# Patient Record
Sex: FEMALE | Race: BLACK OR AFRICAN AMERICAN | NOT HISPANIC OR LATINO | Employment: FULL TIME | URBAN - METROPOLITAN AREA
[De-identification: names, ages, dates, MRNs, and addresses within clinical notes are randomized per-mention and may not be internally consistent; named-entity substitution may affect disease eponyms.]

---
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
## 2023-03-24 PROBLEM — K59.09 CHRONIC CONSTIPATION: Status: ACTIVE | Noted: 2023-03-24

## 2023-03-24 PROBLEM — F41.9 ANXIETY AND DEPRESSION: Status: ACTIVE | Noted: 2023-03-24

## 2023-03-24 PROBLEM — K76.89 HEPATIC FOCAL NODULAR HYPERPLASIA: Status: ACTIVE | Noted: 2023-03-24

## 2023-03-24 PROBLEM — M25.519 PAIN OF SHOULDER AFTER TRAUMA: Status: ACTIVE | Noted: 2023-03-24

## 2023-03-24 PROBLEM — N94.6 DYSMENORRHEA: Status: ACTIVE | Noted: 2023-03-24

## 2023-03-24 PROBLEM — E55.9 VITAMIN D DEFICIENCY: Status: ACTIVE | Noted: 2023-03-24

## 2023-03-24 PROBLEM — S09.90XA HEAD TRAUMA: Status: ACTIVE | Noted: 2023-03-24

## 2023-03-24 PROBLEM — K12.1 RADIATION STOMATITIS: Status: ACTIVE | Noted: 2023-03-24

## 2023-03-24 PROBLEM — M54.9 BACK PAIN: Status: ACTIVE | Noted: 2023-03-24

## 2023-03-24 PROBLEM — J20.9 ACUTE BRONCHITIS: Status: ACTIVE | Noted: 2023-03-24

## 2023-03-24 PROBLEM — E06.5 THYROIDITIS, CHRONIC: Status: ACTIVE | Noted: 2023-03-24

## 2023-03-24 PROBLEM — R16.0 LIVER MASS: Status: ACTIVE | Noted: 2023-03-24

## 2023-03-24 PROBLEM — G51.4 EYELID MYOKYMIA: Status: ACTIVE | Noted: 2023-03-24

## 2023-03-24 PROBLEM — H92.02 LEFT EAR PAIN: Status: ACTIVE | Noted: 2023-03-24

## 2023-03-24 PROBLEM — S79.912A INJURY OF LEFT HIP REGION: Status: ACTIVE | Noted: 2023-03-24

## 2023-03-24 PROBLEM — G89.29 CHRONIC LLQ PAIN: Status: ACTIVE | Noted: 2023-03-24

## 2023-03-24 PROBLEM — S06.0X9A CONCUSSION WITH LOSS OF CONSCIOUSNESS: Status: ACTIVE | Noted: 2023-03-24

## 2023-03-24 PROBLEM — R07.9 LEFT-SIDED CHEST PAIN: Status: ACTIVE | Noted: 2023-03-24

## 2023-03-24 PROBLEM — H52.10 MYOPIA: Status: ACTIVE | Noted: 2023-03-24

## 2023-03-24 PROBLEM — G89.29 CHRONIC LOW BACK PAIN: Status: ACTIVE | Noted: 2023-03-24

## 2023-03-24 PROBLEM — R42 DIZZINESS: Status: ACTIVE | Noted: 2023-03-24

## 2023-03-24 PROBLEM — R63.5 WEIGHT GAIN: Status: ACTIVE | Noted: 2023-03-24

## 2023-03-24 PROBLEM — R53.82 CHRONIC FATIGUE: Status: ACTIVE | Noted: 2023-03-24

## 2023-03-24 PROBLEM — N64.9 BREAST LESION: Status: ACTIVE | Noted: 2023-03-24

## 2023-03-24 PROBLEM — U07.1 COVID-19 VIRUS INFECTION: Status: ACTIVE | Noted: 2023-03-24

## 2023-03-24 PROBLEM — C73 PAPILLARY THYROID CARCINOMA (MULTI): Status: ACTIVE | Noted: 2023-03-24

## 2023-03-24 PROBLEM — R10.9 LEFT FLANK PAIN: Status: ACTIVE | Noted: 2023-03-24

## 2023-03-24 PROBLEM — F51.04 CHRONIC INSOMNIA: Status: ACTIVE | Noted: 2023-03-24

## 2023-03-24 PROBLEM — L02.214 INGUINAL ABSCESS: Status: ACTIVE | Noted: 2023-03-24

## 2023-03-24 PROBLEM — E03.9 HYPOTHYROIDISM: Status: ACTIVE | Noted: 2023-03-24

## 2023-03-24 PROBLEM — F32.A ANXIETY AND DEPRESSION: Status: ACTIVE | Noted: 2023-03-24

## 2023-03-24 PROBLEM — R93.2 ABNORMAL LIVER CT: Status: ACTIVE | Noted: 2023-03-24

## 2023-03-24 PROBLEM — R10.84 GENERALIZED ABDOMINAL PAIN: Status: ACTIVE | Noted: 2023-03-24

## 2023-03-24 PROBLEM — J30.1 ALLERGIC RHINITIS DUE TO POLLEN: Status: ACTIVE | Noted: 2023-03-24

## 2023-03-24 PROBLEM — M54.12 CERVICAL RADICULOPATHY: Status: ACTIVE | Noted: 2023-03-24

## 2023-03-24 PROBLEM — S29.9XXA TRAUMA OF CHEST: Status: ACTIVE | Noted: 2023-03-24

## 2023-03-24 PROBLEM — J45.909 ASTHMA (HHS-HCC): Status: ACTIVE | Noted: 2023-03-24

## 2023-03-24 PROBLEM — K86.2 PANCREAS CYST (HHS-HCC): Status: ACTIVE | Noted: 2023-03-24

## 2023-03-24 PROBLEM — S93.402A LEFT ANKLE SPRAIN: Status: ACTIVE | Noted: 2023-03-24

## 2023-03-24 PROBLEM — M54.2 NECK PAIN: Status: ACTIVE | Noted: 2023-03-24

## 2023-03-24 PROBLEM — N89.8 VAGINAL DISCHARGE: Status: ACTIVE | Noted: 2023-03-24

## 2023-03-24 PROBLEM — M54.50 CHRONIC LOW BACK PAIN: Status: ACTIVE | Noted: 2023-03-24

## 2023-03-24 PROBLEM — M26.69 TMJ INFLAMMATION: Status: ACTIVE | Noted: 2023-03-24

## 2023-03-24 PROBLEM — G47.30 SLEEP APNEA: Status: ACTIVE | Noted: 2023-03-24

## 2023-03-24 PROBLEM — R59.0 AXILLARY ADENOPATHY: Status: ACTIVE | Noted: 2023-03-24

## 2023-03-24 PROBLEM — S59.909A ELBOW INJURY: Status: ACTIVE | Noted: 2023-03-24

## 2023-03-24 PROBLEM — G89.28 CHRONIC POST-OPERATIVE PAIN: Status: ACTIVE | Noted: 2023-03-24

## 2023-03-24 PROBLEM — R59.9 ENLARGED LYMPH NODE: Status: ACTIVE | Noted: 2023-03-24

## 2023-03-24 PROBLEM — C73 PRIMARY MALIGNANT NEOPLASM OF THYROID GLAND (MULTI): Status: ACTIVE | Noted: 2023-03-24

## 2023-03-24 PROBLEM — E88.819 INSULIN RESISTANCE: Status: ACTIVE | Noted: 2023-03-24

## 2023-03-24 PROBLEM — R91.8 LUNG NODULES: Status: ACTIVE | Noted: 2023-03-24

## 2023-03-24 PROBLEM — K21.9 GASTRIC REFLUX: Status: ACTIVE | Noted: 2023-03-24

## 2023-03-24 PROBLEM — K21.00 CHRONIC REFLUX ESOPHAGITIS: Status: ACTIVE | Noted: 2023-03-24

## 2023-03-24 PROBLEM — K62.89: Status: ACTIVE | Noted: 2023-03-24

## 2023-03-24 PROBLEM — J30.9 ALLERGIC RHINITIS: Status: ACTIVE | Noted: 2023-03-24

## 2023-03-24 PROBLEM — J30.89 ALLERGIC RHINITIS DUE TO DUST MITE: Status: ACTIVE | Noted: 2023-03-24

## 2023-03-24 PROBLEM — H20.9 TRAUMATIC IRITIS: Status: ACTIVE | Noted: 2023-03-24

## 2023-03-24 PROBLEM — J45.901 ACUTE ASTHMA EXACERBATION (HHS-HCC): Status: ACTIVE | Noted: 2023-03-24

## 2023-03-24 PROBLEM — M25.572 PAIN IN LEFT ANKLE AND JOINTS OF LEFT FOOT: Status: ACTIVE | Noted: 2023-03-24

## 2023-03-24 PROBLEM — M62.89 PELVIC FLOOR DYSFUNCTION: Status: ACTIVE | Noted: 2023-03-24

## 2023-03-24 PROBLEM — N91.1 SECONDARY AMENORRHEA: Status: ACTIVE | Noted: 2023-03-24

## 2023-03-24 PROBLEM — R10.32 CHRONIC LLQ PAIN: Status: ACTIVE | Noted: 2023-03-24

## 2023-03-24 PROBLEM — H92.03 ACUTE OTALGIA, BILATERAL: Status: ACTIVE | Noted: 2023-03-24

## 2023-03-24 PROBLEM — K64.8 INTERNAL HEMORRHOIDS: Status: ACTIVE | Noted: 2023-03-24

## 2023-03-24 PROBLEM — K14.1 GEOGRAPHIC TONGUE: Status: ACTIVE | Noted: 2023-03-24

## 2023-03-24 PROBLEM — R59.0 ANTERIOR CERVICAL ADENOPATHY: Status: ACTIVE | Noted: 2023-03-24

## 2023-03-24 PROBLEM — S80.02XA TRAUMATIC ECCHYMOSIS OF LEFT KNEE: Status: ACTIVE | Noted: 2023-03-24

## 2023-03-24 RX ORDER — SERTRALINE HYDROCHLORIDE 50 MG/1
1 TABLET, FILM COATED ORAL DAILY
COMMUNITY
Start: 2022-11-09 | End: 2023-10-24 | Stop reason: WASHOUT

## 2023-03-24 RX ORDER — ERGOCALCIFEROL 1.25 MG/1
1 CAPSULE ORAL
COMMUNITY
Start: 2020-02-20

## 2023-03-24 RX ORDER — LEVOTHYROXINE SODIUM 137 UG/1
137 TABLET ORAL
COMMUNITY
End: 2024-01-27 | Stop reason: SDUPTHER

## 2023-03-24 RX ORDER — HYDROXYZINE HYDROCHLORIDE 25 MG/1
1-2 TABLET, FILM COATED ORAL NIGHTLY
COMMUNITY
End: 2023-10-24 | Stop reason: WASHOUT

## 2023-03-24 RX ORDER — LIOTHYRONINE SODIUM 5 UG/1
0.5 TABLET ORAL DAILY
COMMUNITY
Start: 2021-04-06 | End: 2023-09-08 | Stop reason: SDUPTHER

## 2023-03-24 RX ORDER — METHOCARBAMOL 500 MG/1
500 TABLET, FILM COATED ORAL 4 TIMES DAILY PRN
COMMUNITY
End: 2023-10-24 | Stop reason: WASHOUT

## 2023-03-24 RX ORDER — FLUTICASONE PROPIONATE 110 UG/1
2 AEROSOL, METERED RESPIRATORY (INHALATION) 2 TIMES DAILY
COMMUNITY
Start: 2021-09-28 | End: 2023-10-24 | Stop reason: WASHOUT

## 2023-03-24 RX ORDER — LORATADINE 10 MG/1
1 TABLET ORAL
COMMUNITY
Start: 2022-01-12 | End: 2023-03-29 | Stop reason: SDUPTHER

## 2023-03-24 RX ORDER — FLUTICASONE PROPIONATE 50 MCG
2 SPRAY, SUSPENSION (ML) NASAL DAILY
COMMUNITY
Start: 2022-06-15

## 2023-03-29 ENCOUNTER — OFFICE VISIT (OUTPATIENT)
Dept: PRIMARY CARE | Facility: CLINIC | Age: 33
End: 2023-03-29
Payer: COMMERCIAL

## 2023-03-29 VITALS
SYSTOLIC BLOOD PRESSURE: 117 MMHG | WEIGHT: 189 LBS | HEIGHT: 63 IN | BODY MASS INDEX: 33.49 KG/M2 | DIASTOLIC BLOOD PRESSURE: 83 MMHG | TEMPERATURE: 98.2 F | HEART RATE: 94 BPM

## 2023-03-29 DIAGNOSIS — N94.6 DYSMENORRHEA: Primary | ICD-10-CM

## 2023-03-29 DIAGNOSIS — J45.909 MILD ASTHMA WITHOUT COMPLICATION, UNSPECIFIED WHETHER PERSISTENT (HHS-HCC): ICD-10-CM

## 2023-03-29 DIAGNOSIS — R97.0 CARCINOEMBRYONIC ANTIGEN (CEA) ELEVATION: ICD-10-CM

## 2023-03-29 PROBLEM — L50.9 HIVES: Status: ACTIVE | Noted: 2023-03-29

## 2023-03-29 PROBLEM — A09 TRAVELER'S DIARRHEA: Status: ACTIVE | Noted: 2023-03-29

## 2023-03-29 PROCEDURE — 99214 OFFICE O/P EST MOD 30 MIN: CPT | Performed by: FAMILY MEDICINE

## 2023-03-29 PROCEDURE — 1036F TOBACCO NON-USER: CPT | Performed by: FAMILY MEDICINE

## 2023-03-29 RX ORDER — LORATADINE 10 MG/1
10 TABLET ORAL
Qty: 30 TABLET | Refills: 2 | Status: SHIPPED | OUTPATIENT
Start: 2023-03-29 | End: 2023-04-28

## 2023-03-29 ASSESSMENT — ENCOUNTER SYMPTOMS
DEPRESSION: 0
LOSS OF SENSATION IN FEET: 0
OCCASIONAL FEELINGS OF UNSTEADINESS: 0

## 2023-03-29 ASSESSMENT — PATIENT HEALTH QUESTIONNAIRE - PHQ9
1. LITTLE INTEREST OR PLEASURE IN DOING THINGS: NOT AT ALL
SUM OF ALL RESPONSES TO PHQ9 QUESTIONS 1 AND 2: 0
2. FEELING DOWN, DEPRESSED OR HOPELESS: NOT AT ALL

## 2023-03-29 ASSESSMENT — PAIN SCALES - GENERAL: PAINLEVEL: 6

## 2023-03-29 NOTE — PROGRESS NOTES
Subjective   Patient ID: Caroline Bauer is a 32 y.o. female who presents for Rash (From traveling to [facility] ).  Rash        The patient is here for the management of a diarrhea and hives that she developed during the end of her trip to [facility] and [facility].  The diarrhea stopped yesterday after she took some 3y old TRAVEL anti diarrhea medication.  She developed the hives few days after returning home.    She is also requewsting a GYN and GI referral for her pelvic pain and CEA level from 2/13/2023.    A review of system was completed.  All systems were reviewed and were normal, except for the ones that are listed in the HPI.    Objective   Physical Exam  Constitutional:       Appearance: Normal appearance.   HENT:      Head: Normocephalic and atraumatic.      Right Ear: Tympanic membrane, ear canal and external ear normal.      Left Ear: Tympanic membrane, ear canal and external ear normal.      Nose: Nose normal.      Mouth/Throat:      Mouth: Mucous membranes are moist.      Pharynx: Oropharynx is clear.   Eyes:      Extraocular Movements: Extraocular movements intact.      Conjunctiva/sclera: Conjunctivae normal.      Pupils: Pupils are equal, round, and reactive to light.   Cardiovascular:      Rate and Rhythm: Normal rate and regular rhythm.      Pulses: Normal pulses.   Pulmonary:      Effort: Pulmonary effort is normal.      Breath sounds: Normal breath sounds.   Abdominal:      General: Abdomen is flat. Bowel sounds are normal.      Palpations: Abdomen is soft.   Musculoskeletal:         General: Normal range of motion.      Cervical back: Normal range of motion and neck supple.   Skin:     General: Skin is warm.   Neurological:      General: No focal deficit present.      Mental Status: She is alert and oriented to person, place, and time. Mental status is at baseline.   Psychiatric:         Mood and Affect: Mood normal.         Behavior: Behavior normal.          Thought Content: Thought content normal.         Judgment: Judgment normal.         Assessment/Plan   Problem List Items Addressed This Visit    None

## 2023-04-05 LAB
THYROTROPIN (MIU/L) IN SER/PLAS BY DETECTION LIMIT <= 0.05 MIU/L: 0.41 MIU/L (ref 0.44–3.98)
THYROXINE (T4) FREE (NG/DL) IN SER/PLAS: 1.34 NG/DL (ref 0.78–1.48)

## 2023-04-08 LAB
THYROGLOBULIN AB (IU/ML) IN SER/PLAS: <0.9 IU/ML (ref 0–4)
THYROGLOBULIN LC-MS/MS: ABNORMAL NG/ML (ref 1.3–31.8)
THYROGLOBULIN: 0.1 NG/ML (ref 1.3–31.8)

## 2023-04-28 ENCOUNTER — OFFICE VISIT (OUTPATIENT)
Dept: PRIMARY CARE | Facility: CLINIC | Age: 33
End: 2023-04-28
Payer: COMMERCIAL

## 2023-04-28 VITALS
BODY MASS INDEX: 33.48 KG/M2 | WEIGHT: 189 LBS | DIASTOLIC BLOOD PRESSURE: 76 MMHG | HEART RATE: 81 BPM | SYSTOLIC BLOOD PRESSURE: 119 MMHG | TEMPERATURE: 97.3 F

## 2023-04-28 DIAGNOSIS — K12.1 STOMATITIS: Primary | ICD-10-CM

## 2023-04-28 PROCEDURE — 99214 OFFICE O/P EST MOD 30 MIN: CPT | Performed by: FAMILY MEDICINE

## 2023-04-28 PROCEDURE — 1036F TOBACCO NON-USER: CPT | Performed by: FAMILY MEDICINE

## 2023-04-28 RX ORDER — MINERAL OIL
180 ENEMA (ML) RECTAL DAILY
Qty: 30 TABLET | Refills: 5 | Status: SHIPPED | OUTPATIENT
Start: 2023-04-28 | End: 2023-06-11

## 2023-04-28 NOTE — PROGRESS NOTES
Subjective   Patient ID: Caroline Bauer is a 32 y.o. female who presents for Adenopathy.  HPI  The patient is here for the management of a burning tongue.  She thinks that she is reacting to some food when she ingests it.  It started about 6 years ago.    A review of system was completed.  All systems were reviewed and were normal, except for the ones that are listed in the HPI.    Objective   Physical Exam  Constitutional:       Appearance: Normal appearance.   HENT:      Head: Normocephalic and atraumatic.      Right Ear: Tympanic membrane, ear canal and external ear normal.      Left Ear: Tympanic membrane, ear canal and external ear normal.      Nose: Nose normal.      Mouth/Throat:      Mouth: Mucous membranes are moist.      Pharynx: Oropharynx is clear.   Eyes:      Extraocular Movements: Extraocular movements intact.      Conjunctiva/sclera: Conjunctivae normal.      Pupils: Pupils are equal, round, and reactive to light.   Cardiovascular:      Rate and Rhythm: Normal rate and regular rhythm.      Pulses: Normal pulses.   Pulmonary:      Effort: Pulmonary effort is normal.      Breath sounds: Normal breath sounds.   Abdominal:      General: Abdomen is flat. Bowel sounds are normal.      Palpations: Abdomen is soft.   Musculoskeletal:         General: Normal range of motion.      Cervical back: Normal range of motion and neck supple.   Skin:     General: Skin is warm.   Neurological:      General: No focal deficit present.      Mental Status: She is alert and oriented to person, place, and time. Mental status is at baseline.   Psychiatric:         Mood and Affect: Mood normal.         Behavior: Behavior normal.         Thought Content: Thought content normal.         Judgment: Judgment normal.         Assessment/Plan   Problem List Items Addressed This Visit          Infectious/Inflammatory    Stomatitis - Primary    Relevant Medications    fexofenadine (Allegra) 180 mg tablet    Other Relevant Orders     Referral to Allergy

## 2023-05-16 ENCOUNTER — OFFICE VISIT (OUTPATIENT)
Dept: PRIMARY CARE | Facility: CLINIC | Age: 33
End: 2023-05-16
Payer: COMMERCIAL

## 2023-05-16 VITALS
BODY MASS INDEX: 32.96 KG/M2 | DIASTOLIC BLOOD PRESSURE: 73 MMHG | SYSTOLIC BLOOD PRESSURE: 110 MMHG | WEIGHT: 186 LBS | TEMPERATURE: 98.6 F | HEIGHT: 63 IN | HEART RATE: 83 BPM

## 2023-05-16 DIAGNOSIS — J06.9 ACUTE URI: Primary | ICD-10-CM

## 2023-05-16 PROCEDURE — 1036F TOBACCO NON-USER: CPT | Performed by: FAMILY MEDICINE

## 2023-05-16 PROCEDURE — 99213 OFFICE O/P EST LOW 20 MIN: CPT | Performed by: FAMILY MEDICINE

## 2023-05-16 RX ORDER — AZITHROMYCIN 250 MG/1
TABLET, FILM COATED ORAL
Qty: 6 TABLET | Refills: 0 | Status: SHIPPED | OUTPATIENT
Start: 2023-05-16 | End: 2023-05-21

## 2023-05-16 ASSESSMENT — PAIN SCALES - GENERAL: PAINLEVEL: 6

## 2023-05-16 NOTE — PROGRESS NOTES
Subjective   Patient ID: Caroline Bauer is a 32 y.o. female who presents for Establish Care (Sick pt has cough, congestion, diarrhea and sick symptoms ).  HPI    The patient is here for a follow-up visit after the treatment of a URI that started  3 weeks ago and is not getting better.  She still a productive cough, mild diarrhea and sore throat.    A review of system was completed.  All systems were reviewed and were normal, except for the ones that are listed in the HPI.    Objective   Physical Exam  Constitutional:       Appearance: Normal appearance.   HENT:      Head: Normocephalic and atraumatic.      Right Ear: Tympanic membrane, ear canal and external ear normal.      Left Ear: Tympanic membrane, ear canal and external ear normal.      Nose: Nose normal.      Mouth/Throat:      Mouth: Mucous membranes are moist.      Pharynx: Oropharynx is clear.   Eyes:      Extraocular Movements: Extraocular movements intact.      Conjunctiva/sclera: Conjunctivae normal.      Pupils: Pupils are equal, round, and reactive to light.   Cardiovascular:      Rate and Rhythm: Normal rate and regular rhythm.      Pulses: Normal pulses.   Pulmonary:      Effort: Pulmonary effort is normal.      Breath sounds: Normal breath sounds.   Abdominal:      General: Abdomen is flat. Bowel sounds are normal.      Palpations: Abdomen is soft.   Musculoskeletal:         General: Normal range of motion.      Cervical back: Normal range of motion and neck supple.   Skin:     General: Skin is warm.   Neurological:      General: No focal deficit present.      Mental Status: She is alert and oriented to person, place, and time. Mental status is at baseline.   Psychiatric:         Mood and Affect: Mood normal.         Behavior: Behavior normal.         Thought Content: Thought content normal.         Judgment: Judgment normal.         Assessment/Plan   Problem List Items Addressed This Visit          Infectious/Inflammatory    Acute URI -  Primary    Relevant Medications    azithromycin (Zithromax) 250 mg tablet

## 2023-05-25 LAB
ALANINE AMINOTRANSFERASE (SGPT) (U/L) IN SER/PLAS: 12 U/L (ref 7–45)
ALBUMIN (G/DL) IN SER/PLAS: 4.5 G/DL (ref 3.4–5)
ALKALINE PHOSPHATASE (U/L) IN SER/PLAS: 40 U/L (ref 33–110)
ANION GAP IN SER/PLAS: 11 MMOL/L (ref 10–20)
ANTI-DNA (DS): <1 IU/ML
ASPARTATE AMINOTRANSFERASE (SGOT) (U/L) IN SER/PLAS: 14 U/L (ref 9–39)
BASOPHILS (10*3/UL) IN BLOOD BY AUTOMATED COUNT: 0.04 X10E9/L (ref 0–0.1)
BASOPHILS/100 LEUKOCYTES IN BLOOD BY AUTOMATED COUNT: 0.5 % (ref 0–2)
BILIRUBIN TOTAL (MG/DL) IN SER/PLAS: 0.4 MG/DL (ref 0–1.2)
C REACTIVE PROTEIN (MG/L) IN SER/PLAS: 0.6 MG/DL
CALCIDIOL (25 OH VITAMIN D3) (NG/ML) IN SER/PLAS: 94 NG/ML
CALCIUM (MG/DL) IN SER/PLAS: 9.8 MG/DL (ref 8.6–10.6)
CARBON DIOXIDE, TOTAL (MMOL/L) IN SER/PLAS: 27 MMOL/L (ref 21–32)
CHLORIDE (MMOL/L) IN SER/PLAS: 106 MMOL/L (ref 98–107)
COMPLEMENT C3 (MG/DL) IN SER/PLAS: 170 MG/DL (ref 87–200)
COMPLEMENT C4 (MG/DL) IN SER/PLAS: 46 MG/DL (ref 10–50)
CREATINE KINASE (U/L) IN SER/PLAS: 99 U/L (ref 0–215)
CREATININE (MG/DL) IN SER/PLAS: 0.75 MG/DL (ref 0.5–1.05)
EOSINOPHILS (10*3/UL) IN BLOOD BY AUTOMATED COUNT: 0.08 X10E9/L (ref 0–0.7)
EOSINOPHILS/100 LEUKOCYTES IN BLOOD BY AUTOMATED COUNT: 0.9 % (ref 0–6)
ERYTHROCYTE DISTRIBUTION WIDTH (RATIO) BY AUTOMATED COUNT: 12.3 % (ref 11.5–14.5)
ERYTHROCYTE MEAN CORPUSCULAR HEMOGLOBIN CONCENTRATION (G/DL) BY AUTOMATED: 32 G/DL (ref 32–36)
ERYTHROCYTE MEAN CORPUSCULAR VOLUME (FL) BY AUTOMATED COUNT: 95 FL (ref 80–100)
ERYTHROCYTES (10*6/UL) IN BLOOD BY AUTOMATED COUNT: 4.36 X10E12/L (ref 4–5.2)
GFR FEMALE: >90 ML/MIN/1.73M2
GLUCOSE (MG/DL) IN SER/PLAS: 88 MG/DL (ref 74–99)
HEMATOCRIT (%) IN BLOOD BY AUTOMATED COUNT: 41.6 % (ref 36–46)
HEMOGLOBIN (G/DL) IN BLOOD: 13.3 G/DL (ref 12–16)
IMMATURE GRANULOCYTES/100 LEUKOCYTES IN BLOOD BY AUTOMATED COUNT: 0.3 % (ref 0–0.9)
LEUKOCYTES (10*3/UL) IN BLOOD BY AUTOMATED COUNT: 8.8 X10E9/L (ref 4.4–11.3)
LYMPHOCYTES (10*3/UL) IN BLOOD BY AUTOMATED COUNT: 2.19 X10E9/L (ref 1.2–4.8)
LYMPHOCYTES/100 LEUKOCYTES IN BLOOD BY AUTOMATED COUNT: 24.9 % (ref 13–44)
MONOCYTES (10*3/UL) IN BLOOD BY AUTOMATED COUNT: 0.45 X10E9/L (ref 0.1–1)
MONOCYTES/100 LEUKOCYTES IN BLOOD BY AUTOMATED COUNT: 5.1 % (ref 2–10)
NEUTROPHILS (10*3/UL) IN BLOOD BY AUTOMATED COUNT: 6.02 X10E9/L (ref 1.2–7.7)
NEUTROPHILS/100 LEUKOCYTES IN BLOOD BY AUTOMATED COUNT: 68.3 % (ref 40–80)
NRBC (PER 100 WBCS) BY AUTOMATED COUNT: 0 /100 WBC (ref 0–0)
PLATELETS (10*3/UL) IN BLOOD AUTOMATED COUNT: 349 X10E9/L (ref 150–450)
POTASSIUM (MMOL/L) IN SER/PLAS: 4.1 MMOL/L (ref 3.5–5.3)
PROTEIN TOTAL: 7.6 G/DL (ref 6.4–8.2)
RHEUMATOID FACTOR (IU/ML) IN SERUM OR PLASMA: <10 IU/ML (ref 0–15)
SEDIMENTATION RATE, ERYTHROCYTE: 15 MM/H (ref 0–20)
SODIUM (MMOL/L) IN SER/PLAS: 140 MMOL/L (ref 136–145)
THYROTROPIN (MIU/L) IN SER/PLAS BY DETECTION LIMIT <= 0.05 MIU/L: 0.11 MIU/L (ref 0.44–3.98)
THYROXINE (T4) FREE (NG/DL) IN SER/PLAS: 1.37 NG/DL (ref 0.78–1.48)
UREA NITROGEN (MG/DL) IN SER/PLAS: 17 MG/DL (ref 6–23)

## 2023-05-26 LAB
ANA PATTERN: ABNORMAL
ANA TITER: ABNORMAL
ANTI-CENTROMERE: <0.2 AI
ANTI-CHROMATIN: <0.2 AI
ANTI-DNA (DS): <1 IU/ML
ANTI-JO-1 IGG: <0.2 AI
ANTI-NUCLEAR ANTIBODY (ANA): POSITIVE
ANTI-RIBOSOMAL P: <0.2 AI
ANTI-RNP: 0.5 AI
ANTI-SCL-70: <0.2 AI
ANTI-SM/RNP: <0.2 AI
ANTI-SM: <0.2 AI
ANTI-SSA: <0.2 AI
ANTI-SSB: <0.2 AI

## 2023-05-29 LAB
CITRULLINE ANTIBODY, IGG: 6 UNITS (ref 0–19)
THYROGLOBULIN AB (IU/ML) IN SER/PLAS: <0.9 IU/ML (ref 0–4)
THYROGLOBULIN LC-MS/MS: ABNORMAL NG/ML (ref 1.3–31.8)
THYROGLOBULIN: 0.1 NG/ML (ref 1.3–31.8)

## 2023-08-25 ENCOUNTER — OFFICE VISIT (OUTPATIENT)
Dept: PRIMARY CARE | Facility: CLINIC | Age: 33
End: 2023-08-25
Payer: COMMERCIAL

## 2023-08-25 VITALS
SYSTOLIC BLOOD PRESSURE: 119 MMHG | WEIGHT: 195.5 LBS | DIASTOLIC BLOOD PRESSURE: 83 MMHG | HEIGHT: 63 IN | TEMPERATURE: 98.2 F | BODY MASS INDEX: 34.64 KG/M2 | HEART RATE: 82 BPM

## 2023-08-25 DIAGNOSIS — N63.24 MASS OF LOWER INNER QUADRANT OF LEFT BREAST: ICD-10-CM

## 2023-08-25 DIAGNOSIS — R07.9 LEFT-SIDED CHEST PAIN: Primary | ICD-10-CM

## 2023-08-25 DIAGNOSIS — R92.8 ABNORMAL MAMMOGRAM OF LEFT BREAST: ICD-10-CM

## 2023-08-25 PROCEDURE — 1036F TOBACCO NON-USER: CPT | Performed by: FAMILY MEDICINE

## 2023-08-25 PROCEDURE — 99214 OFFICE O/P EST MOD 30 MIN: CPT | Performed by: FAMILY MEDICINE

## 2023-08-25 ASSESSMENT — ENCOUNTER SYMPTOMS
LOSS OF SENSATION IN FEET: 0
DEPRESSION: 0
OCCASIONAL FEELINGS OF UNSTEADINESS: 0
BREAST PAIN: 1

## 2023-08-25 NOTE — PROGRESS NOTES
"Subjective   Patient ID: Caroline Bauer is a 32 y.o. female who presents for Breast Pain.  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  Last mammogram was in 7/2022 and showed extremely dense breast and no malignancy.    A review of system was completed.  All systems were reviewed and were normal, except for the ones that are listed in the HPI.    Objective   Physical Exam  Musculoskeletal:         General: Normal range of motion.   Skin:     General: Skin is warm and dry.      Comments: Breasts and axilla: left breast fulness and tender lumps to palpation at 11:00-1:00, 15 cm from nipple.  Left axilla medial and upper region tender lump/ adenopathy.     Assessment/Plan   Problem List Items Addressed This Visit       Left-sided chest pain - Primary    Relevant Orders    BI mammo left diagnostic tomosynthesis    BI US breast limited left    Abnormal mammogram of left breast    Relevant Orders    BI US breast limited left    Mass of lower inner quadrant of left breast     - left breast fulness and tender lumps to palpation at 11:00-1:00, 15 cm from nipple.  Left axilla medial and upper region tender lump/ adenopathy.  -left mammogram and left breast US ordered.         Relevant Orders    BI mammo left diagnostic tomosynthesis    Patient to return to office as needed.  "

## 2023-08-25 NOTE — ASSESSMENT & PLAN NOTE
- left breast fulness and tender lumps to palpation at 11:00-1:00, 15 cm from nipple.  Left axilla medial and upper region tender lump/ adenopathy.  -left mammogram and left breast US ordered.

## 2023-08-31 LAB
ALANINE AMINOTRANSFERASE (SGPT) (U/L) IN SER/PLAS: 18 U/L (ref 7–45)
ALBUMIN (G/DL) IN SER/PLAS: 4.3 G/DL (ref 3.4–5)
ALKALINE PHOSPHATASE (U/L) IN SER/PLAS: 42 U/L (ref 33–110)
ANION GAP IN SER/PLAS: 14 MMOL/L (ref 10–20)
ASPARTATE AMINOTRANSFERASE (SGOT) (U/L) IN SER/PLAS: 20 U/L (ref 9–39)
BILIRUBIN TOTAL (MG/DL) IN SER/PLAS: 0.5 MG/DL (ref 0–1.2)
CALCIDIOL (25 OH VITAMIN D3) (NG/ML) IN SER/PLAS: 84 NG/ML
CALCIUM (MG/DL) IN SER/PLAS: 10.2 MG/DL (ref 8.6–10.6)
CARBON DIOXIDE, TOTAL (MMOL/L) IN SER/PLAS: 25 MMOL/L (ref 21–32)
CHLORIDE (MMOL/L) IN SER/PLAS: 104 MMOL/L (ref 98–107)
CREATININE (MG/DL) IN SER/PLAS: 0.82 MG/DL (ref 0.5–1.05)
ESTIMATED AVERAGE GLUCOSE FOR HBA1C: 97 MG/DL
GFR FEMALE: >90 ML/MIN/1.73M2
GLUCOSE (MG/DL) IN SER/PLAS: 83 MG/DL (ref 74–99)
HEMOGLOBIN A1C/HEMOGLOBIN TOTAL IN BLOOD: 5 %
INSULIN: 11 UIU/ML (ref 3–25)
POTASSIUM (MMOL/L) IN SER/PLAS: 4.4 MMOL/L (ref 3.5–5.3)
PROTEIN TOTAL: 7.5 G/DL (ref 6.4–8.2)
SODIUM (MMOL/L) IN SER/PLAS: 139 MMOL/L (ref 136–145)
THYROTROPIN (MIU/L) IN SER/PLAS BY DETECTION LIMIT <= 0.05 MIU/L: 0.35 MIU/L (ref 0.44–3.98)
THYROXINE (T4) FREE (NG/DL) IN SER/PLAS: 1.26 NG/DL (ref 0.78–1.48)
TRIIODOTHYRONINE (T3) (NG/DL) IN SER/PLAS: 91 NG/DL (ref 60–200)
UREA NITROGEN (MG/DL) IN SER/PLAS: 13 MG/DL (ref 6–23)

## 2023-09-08 ENCOUNTER — OFFICE VISIT (OUTPATIENT)
Dept: PRIMARY CARE | Facility: CLINIC | Age: 33
End: 2023-09-08
Payer: COMMERCIAL

## 2023-09-08 VITALS — DIASTOLIC BLOOD PRESSURE: 84 MMHG | HEART RATE: 86 BPM | TEMPERATURE: 98 F | SYSTOLIC BLOOD PRESSURE: 137 MMHG

## 2023-09-08 DIAGNOSIS — E03.9 HYPOTHYROIDISM, UNSPECIFIED TYPE: Primary | ICD-10-CM

## 2023-09-08 PROCEDURE — 99213 OFFICE O/P EST LOW 20 MIN: CPT | Performed by: FAMILY MEDICINE

## 2023-09-08 PROCEDURE — 1036F TOBACCO NON-USER: CPT | Performed by: FAMILY MEDICINE

## 2023-09-08 RX ORDER — LIOTHYRONINE SODIUM 5 UG/1
2.5 TABLET ORAL DAILY
Qty: 90 TABLET | Refills: 1 | Status: SHIPPED | OUTPATIENT
Start: 2023-09-08 | End: 2023-10-14

## 2023-09-08 NOTE — ASSESSMENT & PLAN NOTE
-under the care of endocrinologist- left the system.    -endocrinologist referral done.  -Cytomel refilled.

## 2023-09-08 NOTE — PROGRESS NOTES
"Subjective   Patient ID: Caroline Bauer is a 32 y.o. female who presents for Med Refill.  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  Her endocrinologist is no longer in the system and she is due for refill.      A review of system was completed.  All systems were reviewed and were normal, except for the ones that are listed in the HPI.    Objective   Physical Exam  Constitutional:       Appearance: Normal appearance.   HENT:      Head: Normocephalic and atraumatic.      Right Ear: Tympanic membrane, ear canal and external ear normal.      Left Ear: Tympanic membrane, ear canal and external ear normal.      Nose: Nose normal.      Mouth/Throat:      Mouth: Mucous membranes are moist.      Pharynx: Oropharynx is clear.   Eyes:      Extraocular Movements: Extraocular movements intact.      Conjunctiva/sclera: Conjunctivae normal.      Pupils: Pupils are equal, round, and reactive to light.   Cardiovascular:      Rate and Rhythm: Normal rate and regular rhythm.      Pulses: Normal pulses.   Pulmonary:      Effort: Pulmonary effort is normal.      Breath sounds: Normal breath sounds.   Abdominal:      General: Abdomen is flat. Bowel sounds are normal.      Palpations: Abdomen is soft.   Musculoskeletal:         General: Normal range of motion.      Cervical back: Normal range of motion and neck supple.   Skin:     General: Skin is warm.   Neurological:      General: No focal deficit present.      Mental Status: She is alert and oriented to person, place, and time. Mental status is at baseline.   Psychiatric:         Mood and Affect: Mood normal.         Behavior: Behavior normal.         Thought Content: Thought content normal.         Judgment: Judgment normal.         Assessment/Plan   Problem List Items Addressed This Visit       Hypothyroidism - Primary    "  -under the care of endocrinologist- left the system.    -endocrinologist referral done.  -Cytomel refilled.          Relevant Medications    liothyronine (Cytomel) 5 mcg tablet    Other Relevant Orders    Referral to Endocrinology      Patient to return to office in 6 months.

## 2023-09-24 PROBLEM — M25.50 ARTHRALGIA OF MULTIPLE JOINTS: Status: ACTIVE | Noted: 2023-09-24

## 2023-09-24 PROBLEM — S79.912A INJURY OF LEFT HIP REGION: Status: RESOLVED | Noted: 2023-09-24 | Resolved: 2023-09-24

## 2023-09-24 PROBLEM — S16.1XXA CERVICAL STRAIN, ACUTE: Status: ACTIVE | Noted: 2023-09-24

## 2023-09-24 PROBLEM — R04.0 EPISTAXIS: Status: ACTIVE | Noted: 2023-09-24

## 2023-09-24 PROBLEM — E66.811 CLASS 1 OBESITY: Status: ACTIVE | Noted: 2023-09-24

## 2023-09-24 PROBLEM — G47.30 SLEEP APNEA: Status: RESOLVED | Noted: 2023-09-24 | Resolved: 2023-09-24

## 2023-09-24 PROBLEM — L02.11 NECK ABSCESS: Status: ACTIVE | Noted: 2023-09-24

## 2023-09-24 PROBLEM — M94.0 COSTOCHONDRITIS: Status: ACTIVE | Noted: 2023-09-24

## 2023-09-24 PROBLEM — R39.11 URINARY HESITANCY: Status: ACTIVE | Noted: 2023-05-29

## 2023-09-24 PROBLEM — Z98.890 HISTORY OF ENDOSCOPY: Status: ACTIVE | Noted: 2023-05-30

## 2023-09-24 PROBLEM — F41.9 ANXIETY: Status: ACTIVE | Noted: 2023-09-24

## 2023-09-24 PROBLEM — E04.1 THYROID NODULE: Status: ACTIVE | Noted: 2023-09-24

## 2023-09-24 PROBLEM — E66.9 CLASS 1 OBESITY: Status: ACTIVE | Noted: 2023-09-24

## 2023-09-24 PROBLEM — K14.6 BURNING MOUTH SYNDROME: Status: ACTIVE | Noted: 2023-09-24

## 2023-09-24 PROBLEM — H66.90 OTITIS MEDIA, ACUTE: Status: RESOLVED | Noted: 2023-09-24 | Resolved: 2023-09-24

## 2023-09-24 PROBLEM — F33.1 MODERATE EPISODE OF RECURRENT MAJOR DEPRESSIVE DISORDER (MULTI): Status: ACTIVE | Noted: 2023-09-24

## 2023-09-24 PROBLEM — R10.30 LOWER ABDOMINAL PAIN: Status: ACTIVE | Noted: 2023-09-24

## 2023-09-24 PROBLEM — L57.9 SKIN CHANGES DUE TO CHRONIC EXPOSURE TO NONIONIZING RADIATION, UNSPECIFIED: Status: ACTIVE | Noted: 2023-05-15

## 2023-09-24 PROBLEM — R27.8 MUSCULAR INCOORDINATION: Status: ACTIVE | Noted: 2023-05-29

## 2023-09-24 PROBLEM — L85.3 XEROSIS CUTIS: Status: ACTIVE | Noted: 2023-05-15

## 2023-09-24 PROBLEM — M54.16 LUMBAR RADICULOPATHY, CHRONIC: Status: ACTIVE | Noted: 2023-09-24

## 2023-09-24 PROBLEM — R20.2 PARESTHESIA: Status: ACTIVE | Noted: 2023-09-24

## 2023-09-24 PROBLEM — J45.20 MILD INTERMITTENT ASTHMA WITHOUT COMPLICATION (HHS-HCC): Status: ACTIVE | Noted: 2023-09-24

## 2023-09-24 PROBLEM — S05.00XA CORNEAL ABRASION: Status: ACTIVE | Noted: 2023-09-24

## 2023-09-24 PROBLEM — S93.401A SPRAIN OF RIGHT ANKLE: Status: ACTIVE | Noted: 2023-09-24

## 2023-09-24 PROBLEM — J30.2 SEASONAL ALLERGIC RHINITIS: Status: ACTIVE | Noted: 2023-09-24

## 2023-09-24 PROBLEM — T14.8XXA CONTUSION: Status: ACTIVE | Noted: 2023-09-24

## 2023-09-24 PROBLEM — K59.02 SPASTIC PELVIC FLOOR SYNDROME: Status: ACTIVE | Noted: 2023-05-29

## 2023-09-24 PROBLEM — T78.1XXA ORAL ALLERGY SYNDROME: Status: ACTIVE | Noted: 2023-09-24

## 2023-09-24 PROBLEM — J30.81 ALLERGIC RHINITIS DUE TO CATS: Status: ACTIVE | Noted: 2023-09-24

## 2023-09-24 PROBLEM — L70.0 ACNE VULGARIS: Status: ACTIVE | Noted: 2023-05-15

## 2023-09-24 PROBLEM — N64.4 PAIN OF LEFT BREAST: Status: ACTIVE | Noted: 2023-09-24

## 2023-09-24 PROBLEM — M46.1 SACROILIITIS (CMS-HCC): Status: ACTIVE | Noted: 2023-09-24

## 2023-09-24 PROBLEM — S29.9XXA TRAUMA OF CHEST: Status: RESOLVED | Noted: 2023-09-24 | Resolved: 2023-09-24

## 2023-09-24 PROBLEM — T81.49XA POSTOPERATIVE ABSCESS: Status: ACTIVE | Noted: 2023-09-24

## 2023-09-24 PROBLEM — K52.9 ENTERITIS: Status: ACTIVE | Noted: 2023-05-30

## 2023-09-24 PROBLEM — R39.14 FEELING OF INCOMPLETE BLADDER EMPTYING: Status: ACTIVE | Noted: 2023-05-29

## 2023-09-24 PROBLEM — H60.333 ACUTE SWIMMER'S EAR OF BOTH SIDES: Status: RESOLVED | Noted: 2023-09-24 | Resolved: 2023-09-24

## 2023-09-24 PROBLEM — Z20.822 EXPOSURE TO COVID-19 VIRUS: Status: RESOLVED | Noted: 2023-09-24 | Resolved: 2023-09-24

## 2023-09-24 PROBLEM — L21.9 SEBORRHEIC DERMATITIS, UNSPECIFIED: Status: ACTIVE | Noted: 2023-05-15

## 2023-09-24 PROBLEM — S80.02XA TRAUMATIC ECCHYMOSIS OF LEFT KNEE: Status: RESOLVED | Noted: 2023-09-24 | Resolved: 2023-09-24

## 2023-09-24 PROBLEM — R93.3 ABNORMAL FINDING ON GI TRACT IMAGING: Status: ACTIVE | Noted: 2023-05-30

## 2023-09-24 PROBLEM — R59.0 POSTERIOR CERVICAL ADENOPATHY: Status: ACTIVE | Noted: 2023-09-24

## 2023-09-24 PROBLEM — R09.82 PND (POST-NASAL DRIP): Status: ACTIVE | Noted: 2023-09-24

## 2023-09-24 PROBLEM — J02.9 SORE THROAT: Status: RESOLVED | Noted: 2023-09-24 | Resolved: 2023-09-24

## 2023-09-24 RX ORDER — CYCLOBENZAPRINE HCL 5 MG
TABLET ORAL
COMMUNITY
Start: 2023-08-23

## 2023-09-24 RX ORDER — BUPROPION HYDROCHLORIDE 75 MG/1
1 TABLET ORAL 2 TIMES DAILY
COMMUNITY
Start: 2022-10-03 | End: 2023-10-24 | Stop reason: WASHOUT

## 2023-09-24 RX ORDER — LORATADINE 10 MG/1
10 TABLET ORAL DAILY
COMMUNITY
Start: 2017-06-06 | End: 2023-10-24 | Stop reason: WASHOUT

## 2023-09-24 RX ORDER — NORETHINDRONE AND ETHINYL ESTRADIOL 0.4-0.035
1 KIT ORAL DAILY
COMMUNITY
Start: 2022-10-04 | End: 2023-10-24 | Stop reason: WASHOUT

## 2023-09-24 RX ORDER — BUPROPION HYDROCHLORIDE 150 MG/1
1 TABLET ORAL DAILY
COMMUNITY
Start: 2022-10-21 | End: 2023-10-24 | Stop reason: WASHOUT

## 2023-09-24 RX ORDER — CLINDAMYCIN PHOSPHATE 10 UG/ML
1 LOTION TOPICAL
COMMUNITY
Start: 2023-05-15 | End: 2023-10-24 | Stop reason: WASHOUT

## 2023-09-24 RX ORDER — KETOCONAZOLE 20 MG/G
CREAM TOPICAL
COMMUNITY
Start: 2021-12-07 | End: 2023-10-24 | Stop reason: WASHOUT

## 2023-09-24 RX ORDER — DULOXETIN HYDROCHLORIDE 20 MG/1
20 CAPSULE, DELAYED RELEASE ORAL EVERY 12 HOURS
COMMUNITY
Start: 2022-11-10 | End: 2023-10-24 | Stop reason: WASHOUT

## 2023-09-24 RX ORDER — FLUCONAZOLE 100 MG/1
1 TABLET ORAL
COMMUNITY
Start: 2022-09-01 | End: 2023-10-24 | Stop reason: WASHOUT

## 2023-09-24 RX ORDER — LIDOCAINE HYDROCHLORIDE 20 MG/ML
5 SOLUTION OROPHARYNGEAL 3 TIMES DAILY PRN
COMMUNITY
Start: 2023-05-03 | End: 2023-10-24 | Stop reason: WASHOUT

## 2023-09-24 RX ORDER — GABAPENTIN 300 MG/1
300 CAPSULE ORAL
COMMUNITY
Start: 2023-04-27 | End: 2023-10-24 | Stop reason: WASHOUT

## 2023-09-24 RX ORDER — BENZOYL PEROXIDE 50 MG/ML
1 LIQUID TOPICAL EVERY MORNING
COMMUNITY
Start: 2023-05-15 | End: 2023-10-24 | Stop reason: WASHOUT

## 2023-09-24 RX ORDER — PANTOPRAZOLE SODIUM 40 MG/1
40 TABLET, DELAYED RELEASE ORAL
COMMUNITY
Start: 2023-05-23 | End: 2023-10-24 | Stop reason: WASHOUT

## 2023-09-24 RX ORDER — FLUOCINOLONE ACETONIDE 0.1 MG/ML
1 SOLUTION TOPICAL
COMMUNITY
Start: 2021-12-07 | End: 2023-10-24 | Stop reason: WASHOUT

## 2023-09-24 RX ORDER — ASPIRIN 325 MG
TABLET, DELAYED RELEASE (ENTERIC COATED) ORAL
COMMUNITY
Start: 2020-01-01 | End: 2024-01-16 | Stop reason: SDUPTHER

## 2023-09-24 RX ORDER — TRIAMCINOLONE ACETONIDE 55 UG/1
2 SPRAY, METERED NASAL
COMMUNITY
Start: 2022-05-09 | End: 2023-10-24 | Stop reason: WASHOUT

## 2023-09-24 RX ORDER — GABAPENTIN 300 MG/1
CAPSULE ORAL SEE ADMIN INSTRUCTIONS
COMMUNITY
Start: 2023-05-21 | End: 2023-10-24 | Stop reason: WASHOUT

## 2023-09-24 RX ORDER — LEVOTHYROXINE SODIUM 125 UG/1
1 TABLET ORAL DAILY
COMMUNITY
Start: 2022-11-15 | End: 2023-10-24 | Stop reason: WASHOUT

## 2023-09-24 RX ORDER — ACETYLCYSTEINE 600 MG
1 CAPSULE ORAL SEE ADMIN INSTRUCTIONS
COMMUNITY
Start: 2023-07-18 | End: 2023-10-24 | Stop reason: WASHOUT

## 2023-09-24 RX ORDER — KETOCONAZOLE 20 MG/ML
SHAMPOO, SUSPENSION TOPICAL
COMMUNITY
Start: 2021-12-07 | End: 2023-10-24 | Stop reason: WASHOUT

## 2023-10-07 DIAGNOSIS — E03.9 HYPOTHYROIDISM, UNSPECIFIED TYPE: ICD-10-CM

## 2023-10-13 ENCOUNTER — TELEPHONE (OUTPATIENT)
Dept: NEPHROLOGY | Facility: CLINIC | Age: 33
End: 2023-10-13
Payer: COMMERCIAL

## 2023-10-14 RX ORDER — LIOTHYRONINE SODIUM 5 UG/1
TABLET ORAL
Qty: 45 TABLET | Refills: 1 | Status: SHIPPED | OUTPATIENT
Start: 2023-10-14

## 2023-10-20 ENCOUNTER — ANCILLARY PROCEDURE (OUTPATIENT)
Dept: RADIOLOGY | Facility: CLINIC | Age: 33
End: 2023-10-20
Payer: COMMERCIAL

## 2023-10-20 DIAGNOSIS — C73 MALIGNANT NEOPLASM OF THYROID GLAND (MULTI): ICD-10-CM

## 2023-10-20 PROCEDURE — 76536 US EXAM OF HEAD AND NECK: CPT | Performed by: RADIOLOGY

## 2023-10-20 PROCEDURE — 76536 US EXAM OF HEAD AND NECK: CPT

## 2023-10-24 ENCOUNTER — OFFICE VISIT (OUTPATIENT)
Dept: PULMONOLOGY | Facility: HOSPITAL | Age: 33
End: 2023-10-24
Payer: COMMERCIAL

## 2023-10-24 VITALS
HEART RATE: 75 BPM | DIASTOLIC BLOOD PRESSURE: 83 MMHG | OXYGEN SATURATION: 97 % | SYSTOLIC BLOOD PRESSURE: 116 MMHG | TEMPERATURE: 96.1 F

## 2023-10-24 DIAGNOSIS — R91.1 LUNG NODULE: Primary | ICD-10-CM

## 2023-10-24 DIAGNOSIS — R91.8 LUNG NODULES: ICD-10-CM

## 2023-10-24 DIAGNOSIS — J45.20 MILD INTERMITTENT ASTHMA WITHOUT COMPLICATION (HHS-HCC): ICD-10-CM

## 2023-10-24 PROCEDURE — 99213 OFFICE O/P EST LOW 20 MIN: CPT | Performed by: INTERNAL MEDICINE

## 2023-10-24 PROCEDURE — 1036F TOBACCO NON-USER: CPT | Performed by: INTERNAL MEDICINE

## 2023-10-24 SDOH — ECONOMIC STABILITY: FOOD INSECURITY: WITHIN THE PAST 12 MONTHS, YOU WORRIED THAT YOUR FOOD WOULD RUN OUT BEFORE YOU GOT MONEY TO BUY MORE.: NEVER TRUE

## 2023-10-24 SDOH — ECONOMIC STABILITY: FOOD INSECURITY: WITHIN THE PAST 12 MONTHS, THE FOOD YOU BOUGHT JUST DIDN'T LAST AND YOU DIDN'T HAVE MONEY TO GET MORE.: NEVER TRUE

## 2023-10-24 ASSESSMENT — PATIENT HEALTH QUESTIONNAIRE - PHQ9
2. FEELING DOWN, DEPRESSED OR HOPELESS: NOT AT ALL
SUM OF ALL RESPONSES TO PHQ9 QUESTIONS 1 AND 2: 0
1. LITTLE INTEREST OR PLEASURE IN DOING THINGS: NOT AT ALL

## 2023-10-24 ASSESSMENT — LIFESTYLE VARIABLES
HOW OFTEN DO YOU HAVE SIX OR MORE DRINKS ON ONE OCCASION: NEVER
SKIP TO QUESTIONS 9-10: 1
AUDIT-C TOTAL SCORE: 0
HOW OFTEN DO YOU HAVE A DRINK CONTAINING ALCOHOL: NEVER
HOW MANY STANDARD DRINKS CONTAINING ALCOHOL DO YOU HAVE ON A TYPICAL DAY: PATIENT DOES NOT DRINK

## 2023-10-24 ASSESSMENT — PAIN SCALES - GENERAL: PAINLEVEL: 0-NO PAIN

## 2023-10-24 NOTE — PATIENT INSTRUCTIONS
Glad you are doing well  - follow up chest CT in January 2024  - albuterol as needed  - no need for flovent for now  - if you start using albuterol daily or wake up at night with cough or wheezing call me to readjust your therapy  - Follow up in this clinic after the chest CT in January. Keep up the exercise and weight loss. Remember you promised 15 pounds less

## 2023-11-09 PROBLEM — J20.9 ACUTE BRONCHITIS: Status: RESOLVED | Noted: 2023-03-24 | Resolved: 2023-11-09

## 2023-11-09 PROBLEM — J45.901 ACUTE ASTHMA EXACERBATION (HHS-HCC): Status: RESOLVED | Noted: 2023-03-24 | Resolved: 2023-11-09

## 2023-11-09 PROBLEM — J45.909 ASTHMA (HHS-HCC): Status: RESOLVED | Noted: 2023-03-24 | Resolved: 2023-11-09

## 2023-11-09 NOTE — PROGRESS NOTES
Department of Medicine  Division of Pulmonary, Critical Care, and Sleep Medicine  Follow-Up Visit  Date of visit: 10/24/2023  Patient: Caroline Bauer    MRN: [id number]  YOB: 1990   Gender: female  ========================================================================  Reason for this visit  Caroline Bauer is a 33 y.o. female never smoker who presents today for follow-up on  asthma and lung nodule   ========================================================================  IMPRESSION and MANAGEMENT  Ms. Bauer is a 33 y.o. female with the following respiratory problems    1. Lung nodule  CT chest high resolution   5 mm in size. I believe it is in the superior segment of the left lower lobe rather than the upper lobe in comparison to the lungs views from CT scan of the chest done in 2020 this nodule was present and seems unchanged. In 2019 CT scan of the neck the lungs views though reached that area but it was not seen then. PET scan shows minimal activity. This could represent old granulomatous disease. Monitoring is reasonable. last scan in May 2023 showed stability. Will continue to monitor  A much smaller nodule in the superior segment of the right lower lobe is also seen at 3 mm which will be monitored as well. radiology believe it related to fissural LN     2. Mild intermittent asthma without complication     CAT score 24  Rare if any use of rescue inhaler  Weight loss helped. Continued exercise advised   3. Thyroid cancer     Following up with endocrinology     ========================================================================  Physician HPI (11/9/2023):  Ms. Bauer is a 33 y.o. female known with lung nodule and asthma here for follow up  Since last visit she has not needed any hospitalization or ED visits  She has started to exercise regularly and follow a reasonable diet resulting in weight loss and healthier activities    From a respiratory stand point   she  Denies dyspnea at rest, or with activities.  mMRC Grade 0  cough is not present.  No sputum production. Hemoptysis is not reported  chest tightness and  wheezing are not present. The previous chest discomfort is resolved  Denies fever or chills, night sweats, weight loss or gain  Denies problems swallowing or chocking on food or cough when eating, or reflux.   no chest pain, orthopnea, PND or LE edema  ========================================================================  Physical Examination:  /83   Pulse 75   Temp 35.6 °C (96.1 °F)   SpO2 97%  There is no height or weight on file to calculate BMI.  GENERAL: normal appearance. well nourished. No respiratory distress  HEAD/SINUSES: no sinus tenderness  OROPHARYNX: Moist mucosa, no thrush or lesions - Mallampati IV (only hard palate visible)  NECK: no JVD, midline trachea without stridor. Thyroid not enlarged  LYMPH NODES : none felt in the cervical, submandibular or supraclavicular regions  LUNGS: Symmetric chest. Good excursion. Equal breath sounds. no wheezing. no crackles or rhonchi  CARDIAC: normal S1 and S2; no gallops, rubs or murmurs. Regular rate and rhythm  EXTREMITIES: No edema, no varicose veins  NEURO: grossly normal mental status, CN reflexes and motor strength.   SKIN: Skin turgor normal. No rashes or lesions.   PSYCH: Normal affect    Pulmonary Function Test Results - my personal interpretation     PFT - April 202 was without any obstruction    Chest CT Scan - my personal interpretation in agreement with radiology   Last scan from  is unchanged size and configuration of the left lower lobe nodule    Current Outpatient Medications   Medication Instructions    albuterol 90 mcg/actuation aerosol powdr breath activated inhaler 2 puffs, inhalation, Every 6 hours PRN    cholecalciferol (Vitamin D-3) 1,250 mcg (50,000 unit) capsule     cyclobenzaprine (Flexeril) 5 mg tablet PLEASE SEE ATTACHED FOR DETAILED DIRECTIONS     ergocalciferol (Vitamin D-2) 1.25 MG (37661 UT) capsule 1 capsule, oral, Weekly    fluticasone (Flonase) 50 mcg/actuation nasal spray 2 sprays, nasal, Daily    levothyroxine (SYNTHROID, LEVOXYL) 137 mcg, oral, Daily before breakfast    liothyronine (Cytomel) 5 mcg tablet TAKE 1/2 TABLETS (2.5 MCG) BY MOUTH ONCE DAILY      ========================================================================  Drug Allergies/Intolerances:  Allergies   Allergen Reactions    Escitalopram Anxiety, Hallucinations, Hives, Other and Palpitations     depression        Disclosure: Parts of this note may have been scribed or generated using voice dictation software, Dragon.  Homophonic errors may exist.  Please contact me directly if clarification is needed    Gurinder Arellano MD  10/24/2023

## 2023-12-06 ENCOUNTER — OFFICE VISIT (OUTPATIENT)
Dept: PRIMARY CARE | Facility: CLINIC | Age: 33
End: 2023-12-06
Payer: COMMERCIAL

## 2023-12-06 VITALS
SYSTOLIC BLOOD PRESSURE: 111 MMHG | HEART RATE: 77 BPM | TEMPERATURE: 98.3 F | WEIGHT: 195.8 LBS | DIASTOLIC BLOOD PRESSURE: 78 MMHG | BODY MASS INDEX: 34.68 KG/M2

## 2023-12-06 DIAGNOSIS — C73 PRIMARY MALIGNANT NEOPLASM OF THYROID GLAND (MULTI): ICD-10-CM

## 2023-12-06 DIAGNOSIS — Z13.1 DIABETES MELLITUS SCREENING: ICD-10-CM

## 2023-12-06 DIAGNOSIS — Z00.00 HEALTH CARE MAINTENANCE: Primary | ICD-10-CM

## 2023-12-06 DIAGNOSIS — Z13.220 SCREENING CHOLESTEROL LEVEL: ICD-10-CM

## 2023-12-06 DIAGNOSIS — R51.9 RIGHT-SIDED HEADACHE: ICD-10-CM

## 2023-12-06 PROCEDURE — 99395 PREV VISIT EST AGE 18-39: CPT | Performed by: FAMILY MEDICINE

## 2023-12-06 PROCEDURE — 1036F TOBACCO NON-USER: CPT | Performed by: FAMILY MEDICINE

## 2023-12-06 PROCEDURE — 99212 OFFICE O/P EST SF 10 MIN: CPT | Performed by: FAMILY MEDICINE

## 2023-12-06 NOTE — ASSESSMENT & PLAN NOTE
-Etiology unsure.  -MRI brain ordered because he of her recent thyroid cancer surgery.   16-Jan-2020 10:25

## 2023-12-06 NOTE — PROGRESS NOTES
"Subjective   Patient ID: Caroline Bauer is a 33 y.o. female who presents for Follow-up.    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  -Covid and Influenza vaccines due.  -pap smear 2021.  Wnl- no HPV.    2-  Right parietal pressure that started  Over a year ago.      A review of system was completed.  All systems were reviewed and were normal, except for the ones that are listed in the HPI.    Objective   Physical Exam  Constitutional:       Appearance: Normal appearance.   HENT:      Head: Normocephalic and atraumatic.      Right Ear: Tympanic membrane, ear canal and external ear normal.      Left Ear: Tympanic membrane, ear canal and external ear normal.      Nose: Nose normal.      Mouth/Throat:      Mouth: Mucous membranes are moist.      Pharynx: Oropharynx is clear.   Eyes:      Extraocular Movements: Extraocular movements intact.      Conjunctiva/sclera: Conjunctivae normal.      Pupils: Pupils are equal, round, and reactive to light.   Cardiovascular:      Rate and Rhythm: Normal rate and regular rhythm.      Pulses: Normal pulses.   Pulmonary:      Effort: Pulmonary effort is normal.      Breath sounds: Normal breath sounds.   Abdominal:      General: Abdomen is flat. Bowel sounds are normal.      Palpations: Abdomen is soft.   Musculoskeletal:         General: Normal range of motion.      Cervical back: Normal range of motion and neck supple.   Skin:     General: Skin is warm.   Neurological:      General: No focal deficit present.      Mental Status: She is alert and oriented to person, place, and time. Mental status is at baseline.   Psychiatric:         Mood and Affect: Mood normal.         Behavior: Behavior normal.         Thought Content: Thought content normal.         Judgment: Judgment normal.     Assessment/Plan   Problem List Items Addressed " This Visit       Primary malignant neoplasm of thyroid gland (CMS/HCC)    Relevant Orders    MR brain wo IV contrast    Health care maintenance - Primary     -Covid and Influenza vaccines due.  -pap smear 2021.  Wnl- no HPV.           Screening cholesterol level    Diabetes mellitus screening    Right-sided headache     -Etiology unsure.  -MRI brain ordered because he of her recent thyroid cancer surgery.         Relevant Orders    MR brain wo IV contrast    Patient to return to office in 6 months.

## 2023-12-21 ENCOUNTER — ANCILLARY PROCEDURE (OUTPATIENT)
Dept: RADIOLOGY | Facility: CLINIC | Age: 33
End: 2023-12-21
Payer: COMMERCIAL

## 2023-12-21 DIAGNOSIS — R51.9 RIGHT-SIDED HEADACHE: ICD-10-CM

## 2023-12-21 DIAGNOSIS — C73 PRIMARY MALIGNANT NEOPLASM OF THYROID GLAND (MULTI): ICD-10-CM

## 2023-12-21 PROCEDURE — 70551 MRI BRAIN STEM W/O DYE: CPT | Performed by: RADIOLOGY

## 2023-12-21 PROCEDURE — 70551 MRI BRAIN STEM W/O DYE: CPT

## 2023-12-26 DIAGNOSIS — J45.20 MILD INTERMITTENT ASTHMA WITHOUT COMPLICATION (HHS-HCC): Primary | ICD-10-CM

## 2023-12-27 ENCOUNTER — APPOINTMENT (OUTPATIENT)
Dept: RADIOLOGY | Facility: CLINIC | Age: 33
End: 2023-12-27
Payer: COMMERCIAL

## 2023-12-27 RX ORDER — DEXAMETHASONE 4 MG/1
2 TABLET ORAL 2 TIMES DAILY
Qty: 1 G | Refills: 3 | Status: SHIPPED | OUTPATIENT
Start: 2023-12-27 | End: 2024-04-11 | Stop reason: WASHOUT
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
## 2024-01-10 ENCOUNTER — OFFICE VISIT (OUTPATIENT)
Dept: OTOLARYNGOLOGY | Facility: HOSPITAL | Age: 34
End: 2024-01-10
Payer: COMMERCIAL

## 2024-01-10 VITALS — HEIGHT: 64 IN | BODY MASS INDEX: 33.61 KG/M2 | TEMPERATURE: 96.4 F

## 2024-01-10 DIAGNOSIS — E03.9 ACQUIRED HYPOTHYROIDISM: ICD-10-CM

## 2024-01-10 DIAGNOSIS — C73 PAPILLARY THYROID CARCINOMA (MULTI): ICD-10-CM

## 2024-01-10 DIAGNOSIS — K13.70 MOUTH LESION: Primary | ICD-10-CM

## 2024-01-10 PROCEDURE — 1036F TOBACCO NON-USER: CPT | Performed by: OTOLARYNGOLOGY

## 2024-01-10 PROCEDURE — 99213 OFFICE O/P EST LOW 20 MIN: CPT | Performed by: OTOLARYNGOLOGY

## 2024-01-10 ASSESSMENT — PATIENT HEALTH QUESTIONNAIRE - PHQ9
1. LITTLE INTEREST OR PLEASURE IN DOING THINGS: NOT AT ALL
2. FEELING DOWN, DEPRESSED OR HOPELESS: NOT AT ALL
SUM OF ALL RESPONSES TO PHQ9 QUESTIONS 1 & 2: 0

## 2024-01-15 ENCOUNTER — APPOINTMENT (OUTPATIENT)
Dept: PRIMARY CARE | Facility: CLINIC | Age: 34
End: 2024-01-15
Payer: COMMERCIAL

## 2024-01-16 ENCOUNTER — OFFICE VISIT (OUTPATIENT)
Dept: PRIMARY CARE | Facility: CLINIC | Age: 34
End: 2024-01-16
Payer: COMMERCIAL

## 2024-01-16 VITALS
HEART RATE: 90 BPM | TEMPERATURE: 96 F | WEIGHT: 200 LBS | DIASTOLIC BLOOD PRESSURE: 84 MMHG | BODY MASS INDEX: 34.33 KG/M2 | SYSTOLIC BLOOD PRESSURE: 119 MMHG

## 2024-01-16 DIAGNOSIS — E03.9 HYPOTHYROIDISM, UNSPECIFIED TYPE: ICD-10-CM

## 2024-01-16 DIAGNOSIS — E89.0 POSTOPERATIVE HYPOTHYROIDISM: ICD-10-CM

## 2024-01-16 DIAGNOSIS — E55.9 VITAMIN D DEFICIENCY: ICD-10-CM

## 2024-01-16 DIAGNOSIS — L02.214 INGUINAL ABSCESS: Primary | ICD-10-CM

## 2024-01-16 PROCEDURE — 99214 OFFICE O/P EST MOD 30 MIN: CPT | Performed by: FAMILY MEDICINE

## 2024-01-16 PROCEDURE — 1036F TOBACCO NON-USER: CPT | Performed by: FAMILY MEDICINE

## 2024-01-16 RX ORDER — LEVOTHYROXINE SODIUM 137 UG/1
137 TABLET ORAL
Qty: 90 TABLET | Refills: 1 | Status: SHIPPED | OUTPATIENT
Start: 2024-01-16 | End: 2025-01-15

## 2024-01-16 RX ORDER — LIOTHYRONINE SODIUM 5 UG/1
5 TABLET ORAL DAILY
Qty: 45 TABLET | Refills: 3 | Status: SHIPPED | OUTPATIENT
Start: 2024-01-16 | End: 2024-01-27 | Stop reason: SDUPTHER

## 2024-01-16 RX ORDER — ASPIRIN 325 MG
TABLET, DELAYED RELEASE (ENTERIC COATED) ORAL
Qty: 12 CAPSULE | Refills: 3 | Status: SHIPPED | OUTPATIENT
Start: 2024-01-16

## 2024-01-16 ASSESSMENT — PAIN SCALES - GENERAL: PAINLEVEL: 4

## 2024-01-16 NOTE — PROGRESS NOTES
"Subjective   Patient ID: Caroline Bauer is a 33 y.o. female who presents for Headache and Med Refill.    Med Refill    The patient is a 34 yo A female with a history of anxiety, s/p right minimally invasive follicular papillary thyroid carcinoma s/p right thyroidectomy \"20 here for:    1- Medication refills: Levothyroxine and Cytomel.    A review of system was completed.  All systems were reviewed and were normal, except for the ones that are listed in the HPI.    Objective   Physical Exam  Constitutional:       Appearance: Normal appearance.   HENT:      Head: Normocephalic and atraumatic.      Right Ear: Tympanic membrane, ear canal and external ear normal.      Left Ear: Tympanic membrane, ear canal and external ear normal.      Nose: Nose normal.      Mouth/Throat:      Mouth: Mucous membranes are moist.      Pharynx: Oropharynx is clear.   Eyes:      Extraocular Movements: Extraocular movements intact.      Conjunctiva/sclera: Conjunctivae normal.      Pupils: Pupils are equal, round, and reactive to light.   Cardiovascular:      Rate and Rhythm: Normal rate and regular rhythm.      Pulses: Normal pulses.   Pulmonary:      Effort: Pulmonary effort is normal.      Breath sounds: Normal breath sounds.   Abdominal:      General: Abdomen is flat. Bowel sounds are normal.      Palpations: Abdomen is soft.   Musculoskeletal:         General: Normal range of motion.      Cervical back: Normal range of motion and neck supple.   Skin:     General: Skin is warm.   Neurological:      General: No focal deficit present.      Mental Status: She is alert and oriented to person, place, and time. Mental status is at baseline.   Psychiatric:         Mood and Affect: Mood normal.         Behavior: Behavior normal.         Thought Content: Thought content normal.         Judgment: Judgment normal.     Assessment/Plan   Problem List Items Addressed This Visit       Hypothyroidism     MEDICATIONS REFILLED ROBEL.         " Relevant Medications    Synthroid 137 mcg tablet    Cytomel 5 mcg tablet    Inguinal abscess - Primary    Vitamin D deficiency    Relevant Medications    cholecalciferol (Vitamin D-3) 50,000 unit capsule    Patient to return to office in 6 months.

## 2024-01-17 DIAGNOSIS — R13.10 ODYNOPHAGIA: Primary | ICD-10-CM

## 2024-01-17 RX ORDER — DEXAMETHASONE 0.5 MG/5ML
SOLUTION ORAL
Qty: 300 ML | Refills: 1 | Status: SHIPPED | OUTPATIENT
Start: 2024-01-17 | End: 2024-01-21 | Stop reason: SDUPTHER

## 2024-01-21 DIAGNOSIS — R13.10 ODYNOPHAGIA: ICD-10-CM

## 2024-01-21 PROBLEM — K13.70 MOUTH LESION: Status: ACTIVE | Noted: 2024-01-21

## 2024-01-21 RX ORDER — DEXAMETHASONE 0.5 MG/5ML
SOLUTION ORAL
Qty: 300 ML | Refills: 2 | Status: SHIPPED | OUTPATIENT
Start: 2024-01-21

## 2024-01-21 ASSESSMENT — ENCOUNTER SYMPTOMS
VOICE CHANGE: 0
UNEXPECTED WEIGHT CHANGE: 0
FATIGUE: 0
NAUSEA: 0
FEVER: 0
STRIDOR: 0
SHORTNESS OF BREATH: 0
CHILLS: 0
FACIAL SWELLING: 0
SORE THROAT: 0
TROUBLE SWALLOWING: 0
COUGH: 0
APPETITE CHANGE: 0
NECK PAIN: 0
ADENOPATHY: 0
VOMITING: 0

## 2024-01-22 ENCOUNTER — TELEPHONE (OUTPATIENT)
Dept: PRIMARY CARE | Facility: CLINIC | Age: 34
End: 2024-01-22
Payer: COMMERCIAL

## 2024-01-22 DIAGNOSIS — E03.9 HYPOTHYROIDISM, UNSPECIFIED TYPE: ICD-10-CM

## 2024-01-22 NOTE — ASSESSMENT & PLAN NOTE
She has an inflamed taste bud - circumvallate papillae   Recommend dexamethasone mouthwash  Reassurance provided, no tongue mass or ulcer

## 2024-01-22 NOTE — TELEPHONE ENCOUNTER
Pt called stating that the cytomel 5 mcg needs to be written as a 90 day supply and the synthroid needs to written as levothyroxine so the pharmacy can give her the medication.

## 2024-01-27 RX ORDER — LIOTHYRONINE SODIUM 5 UG/1
5 TABLET ORAL DAILY
Qty: 90 TABLET | Refills: 1 | Status: SHIPPED | OUTPATIENT
Start: 2024-01-27 | End: 2025-01-26

## 2024-01-27 RX ORDER — LEVOTHYROXINE SODIUM 137 UG/1
137 TABLET ORAL
Qty: 90 TABLET | Refills: 1 | Status: SHIPPED | OUTPATIENT
Start: 2024-01-27 | End: 2024-04-11 | Stop reason: WASHOUT

## 2024-02-06 ENCOUNTER — APPOINTMENT (OUTPATIENT)
Dept: PULMONOLOGY | Facility: HOSPITAL | Age: 34
End: 2024-02-06
Payer: COMMERCIAL
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
## 2024-02-29 PROBLEM — M79.602 PAIN IN LEFT ARM: Status: ACTIVE | Noted: 2023-05-23

## 2024-02-29 PROBLEM — E85.81 LIGHT CHAIN (AL) AMYLOIDOSIS (MULTI): Status: ACTIVE | Noted: 2022-04-06

## 2024-02-29 PROBLEM — Z85.850 PERSONAL HISTORY OF MALIGNANT NEOPLASM OF THYROID: Status: ACTIVE | Noted: 2023-02-13

## 2024-02-29 PROBLEM — L02.214 ABSCESS OF GROIN: Status: ACTIVE | Noted: 2023-03-24

## 2024-02-29 PROBLEM — M25.50 ARTHRALGIA: Status: ACTIVE | Noted: 2024-02-29

## 2024-02-29 PROBLEM — M25.511 CHRONIC RIGHT SHOULDER PAIN: Status: ACTIVE | Noted: 2023-12-20

## 2024-02-29 PROBLEM — R13.10 DYSPHAGIA: Status: ACTIVE | Noted: 2021-12-24

## 2024-02-29 PROBLEM — T66.XXXA RADIATION SICKNESS, UNSPECIFIED, INITIAL ENCOUNTER: Status: ACTIVE | Noted: 2021-12-24

## 2024-02-29 PROBLEM — Z90.89 ACQUIRED ABSENCE OF OTHER ORGANS: Status: ACTIVE | Noted: 2022-11-08

## 2024-02-29 PROBLEM — K59.02 ANISMUS: Status: ACTIVE | Noted: 2023-05-29

## 2024-02-29 PROBLEM — R91.8 MULTIPLE PULMONARY NODULES: Status: ACTIVE | Noted: 2023-03-24

## 2024-02-29 PROBLEM — K76.9 LIVER DISEASE, UNSPECIFIED: Status: ACTIVE | Noted: 2022-11-08

## 2024-02-29 PROBLEM — Z01.00 EYE EXAM, ROUTINE: Status: ACTIVE | Noted: 2024-02-29

## 2024-02-29 PROBLEM — Z92.3 PERSONAL HISTORY OF IRRADIATION: Status: ACTIVE | Noted: 2021-12-24

## 2024-02-29 PROBLEM — M25.579 ACUTE ANKLE PAIN: Status: ACTIVE | Noted: 2024-02-29

## 2024-02-29 PROBLEM — E66.9 OBESITY: Status: ACTIVE | Noted: 2024-02-29

## 2024-02-29 PROBLEM — N80.03 ADENOMYOSIS OF THE UTERUS: Status: ACTIVE | Noted: 2023-01-20

## 2024-02-29 PROBLEM — R25.3 EYELID TWITCH: Status: ACTIVE | Noted: 2024-02-29

## 2024-02-29 PROBLEM — H92.02 OTALGIA OF LEFT EAR: Status: ACTIVE | Noted: 2023-03-24

## 2024-02-29 PROBLEM — G89.29 CHRONIC RIGHT SHOULDER PAIN: Status: ACTIVE | Noted: 2023-12-20

## 2024-02-29 PROBLEM — R51.9 HEADACHE: Status: ACTIVE | Noted: 2023-12-06

## 2024-02-29 PROBLEM — M25.579 CHRONIC ANKLE PAIN: Status: ACTIVE | Noted: 2024-02-29

## 2024-02-29 PROBLEM — R91.8 OTHER NONSPECIFIC ABNORMAL FINDING OF LUNG FIELD: Status: ACTIVE | Noted: 2022-11-08

## 2024-02-29 PROBLEM — R39.89: Status: ACTIVE | Noted: 2023-05-29

## 2024-02-29 PROBLEM — M54.2 NECK PAIN ON RIGHT SIDE: Status: ACTIVE | Noted: 2023-12-20

## 2024-02-29 PROBLEM — K76.89 FOCAL NODULAR HYPERPLASIA OF LIVER: Status: ACTIVE | Noted: 2023-03-24

## 2024-02-29 PROBLEM — M26.69 INFLAMMATION OF TEMPOROMANDIBULAR JOINT: Status: ACTIVE | Noted: 2023-03-24

## 2024-02-29 PROBLEM — T66.XXXA ADVERSE EFFECT OF RADIATION: Status: ACTIVE | Noted: 2024-02-29

## 2024-02-29 PROBLEM — E88.89 OTHER SPECIFIED METABOLIC DISORDERS (MULTI): Status: ACTIVE | Noted: 2022-01-19

## 2024-02-29 PROBLEM — D25.1 INTRAMURAL LEIOMYOMA OF UTERUS: Status: ACTIVE | Noted: 2023-02-01

## 2024-02-29 PROBLEM — H92.09 PAIN OF EAR STRUCTURE: Status: ACTIVE | Noted: 2023-03-24

## 2024-02-29 PROBLEM — G89.29 CHRONIC ANKLE PAIN: Status: ACTIVE | Noted: 2024-02-29

## 2024-02-29 PROBLEM — H10.10 ALLERGIC CONJUNCTIVITIS: Status: ACTIVE | Noted: 2024-02-29

## 2024-02-29 PROBLEM — E89.0 POSTPROCEDURAL HYPOTHYROIDISM: Status: ACTIVE | Noted: 2023-05-12

## 2024-02-29 PROBLEM — T66.XXXA RADIATION DISEASE: Status: ACTIVE | Noted: 2024-02-29

## 2024-02-29 PROBLEM — M25.579 PAIN IN JOINT INVOLVING ANKLE AND FOOT: Status: ACTIVE | Noted: 2023-03-24

## 2024-02-29 RX ORDER — MINERAL OIL
180 ENEMA (ML) RECTAL
COMMUNITY
Start: 2023-04-28 | End: 2024-04-11 | Stop reason: WASHOUT

## 2024-02-29 RX ORDER — NORETHINDRONE ACETATE AND ETHINYL ESTRADIOL 1; 20 MG/1; UG/1
TABLET ORAL
COMMUNITY
Start: 2024-02-03

## 2024-02-29 RX ORDER — FLUOXETINE HYDROCHLORIDE 20 MG/1
CAPSULE ORAL
COMMUNITY
Start: 2022-10-28

## 2024-03-04 ENCOUNTER — APPOINTMENT (OUTPATIENT)
Dept: SURGICAL ONCOLOGY | Facility: CLINIC | Age: 34
End: 2024-03-04
Payer: COMMERCIAL

## 2024-03-04 ENCOUNTER — APPOINTMENT (OUTPATIENT)
Dept: RADIOLOGY | Facility: CLINIC | Age: 34
End: 2024-03-04
Payer: COMMERCIAL

## 2024-03-04 ENCOUNTER — ALLIED HEALTH (OUTPATIENT)
Dept: INTEGRATIVE MEDICINE | Facility: CLINIC | Age: 34
End: 2024-03-04

## 2024-03-04 PROCEDURE — CYTAX SALES TAX CUYAHOGA COUNT: Performed by: MASSAGE THERAPIST

## 2024-03-04 PROCEDURE — MASS(60M) MASSAGE 60 MINUTES: Performed by: MASSAGE THERAPIST
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
## 2024-03-06 ENCOUNTER — OFFICE VISIT (OUTPATIENT)
Dept: PRIMARY CARE | Facility: CLINIC | Age: 34
End: 2024-03-06
Payer: COMMERCIAL

## 2024-03-06 ENCOUNTER — LAB (OUTPATIENT)
Dept: LAB | Facility: LAB | Age: 34
End: 2024-03-06
Payer: COMMERCIAL

## 2024-03-06 VITALS
DIASTOLIC BLOOD PRESSURE: 76 MMHG | WEIGHT: 204 LBS | SYSTOLIC BLOOD PRESSURE: 108 MMHG | TEMPERATURE: 96.9 F | BODY MASS INDEX: 35.02 KG/M2 | HEART RATE: 74 BPM

## 2024-03-06 DIAGNOSIS — N30.01 ACUTE CYSTITIS WITH HEMATURIA: Primary | ICD-10-CM

## 2024-03-06 DIAGNOSIS — R10.12 LUQ ABDOMINAL PAIN: ICD-10-CM

## 2024-03-06 DIAGNOSIS — N30.01 ACUTE CYSTITIS WITH HEMATURIA: ICD-10-CM

## 2024-03-06 LAB
APPEARANCE UR: CLEAR
BILIRUB UR STRIP.AUTO-MCNC: NEGATIVE MG/DL
COLOR UR: NORMAL
GLUCOSE UR STRIP.AUTO-MCNC: NORMAL MG/DL
KETONES UR STRIP.AUTO-MCNC: NEGATIVE MG/DL
LEUKOCYTE ESTERASE UR QL STRIP.AUTO: NEGATIVE
NITRITE UR QL STRIP.AUTO: NEGATIVE
PH UR STRIP.AUTO: 6 [PH]
PROT UR STRIP.AUTO-MCNC: NEGATIVE MG/DL
RBC # UR STRIP.AUTO: NEGATIVE /UL
SP GR UR STRIP.AUTO: 1.03
UROBILINOGEN UR STRIP.AUTO-MCNC: NORMAL MG/DL

## 2024-03-06 PROCEDURE — 99214 OFFICE O/P EST MOD 30 MIN: CPT | Performed by: FAMILY MEDICINE

## 2024-03-06 PROCEDURE — 87086 URINE CULTURE/COLONY COUNT: CPT

## 2024-03-06 PROCEDURE — 1036F TOBACCO NON-USER: CPT | Performed by: FAMILY MEDICINE

## 2024-03-06 PROCEDURE — 81003 URINALYSIS AUTO W/O SCOPE: CPT

## 2024-03-06 RX ORDER — NITROFURANTOIN 25; 75 MG/1; MG/1
100 CAPSULE ORAL 2 TIMES DAILY
Qty: 20 CAPSULE | Refills: 0 | Status: SHIPPED | OUTPATIENT
Start: 2024-03-06 | End: 2024-03-16

## 2024-03-06 ASSESSMENT — ENCOUNTER SYMPTOMS: ABDOMINAL PAIN: 1

## 2024-03-06 NOTE — PROGRESS NOTES
"Subjective   Patient ID: Caroline Bauer is a 33 y.o. female who presents for UTI and Abdominal Pain.    The patient is a 32 yo A female with a history of anxiety, s/p right minimally invasive follicular papillary thyroid carcinoma s/p right thyroidectomy \"20 here for:    1- a UTI  2- RUQ abdominal pain.  It is worse with local pressure and movement of her trunk.     A review of system was completed.  All systems were reviewed and were normal, except for the ones that are listed in the HPI.    Objective   Physical Exam  Constitutional:       Appearance: Normal appearance.   HENT:      Head: Normocephalic and atraumatic.      Right Ear: Tympanic membrane, ear canal and external ear normal.      Left Ear: Tympanic membrane, ear canal and external ear normal.      Nose: Nose normal.      Mouth/Throat:      Mouth: Mucous membranes are moist.      Pharynx: Oropharynx is clear.   Eyes:      Extraocular Movements: Extraocular movements intact.      Conjunctiva/sclera: Conjunctivae normal.      Pupils: Pupils are equal, round, and reactive to light.   Cardiovascular:      Rate and Rhythm: Normal rate and regular rhythm.      Pulses: Normal pulses.   Pulmonary:      Effort: Pulmonary effort is normal.      Breath sounds: Normal breath sounds.   Abdominal:      General: Abdomen is flat. Bowel sounds are normal.      Palpations: Abdomen is soft.      Comments: -Tenderness to palpation of the LUQ abdominal region, at the margin of the anterior and lateral lowest rib.    Musculoskeletal:         General: Normal range of motion.      Cervical back: Normal range of motion and neck supple.   Skin:     General: Skin is warm.   Neurological:      General: No focal deficit present.      Mental Status: She is alert and oriented to person, place, and time. Mental status is at baseline.   Psychiatric:         Mood and Affect: Mood normal.         Behavior: Behavior normal.         Thought Content: Thought content normal.         " Judgment: Judgment normal.   Assessment/Plan   Problem List Items Addressed This Visit       Acute cystitis with hematuria - Primary     -UA w/ C+S ordered.         Relevant Medications    nitrofurantoin, macrocrystal-monohydrate, (Macrobid) 100 mg capsule    Other Relevant Orders    POCT UA Automated manually resulted    Urine Culture    LUQ abdominal pain    Patient to return to office in 6 months.

## 2024-03-07 ENCOUNTER — HOSPITAL ENCOUNTER (OUTPATIENT)
Dept: RADIOLOGY | Facility: CLINIC | Age: 34
Discharge: HOME | End: 2024-03-07
Payer: COMMERCIAL

## 2024-03-07 DIAGNOSIS — R10.12 LUQ ABDOMINAL PAIN: ICD-10-CM

## 2024-03-07 LAB — BACTERIA UR CULT: NORMAL

## 2024-03-07 PROCEDURE — 76700 US EXAM ABDOM COMPLETE: CPT

## 2024-03-07 PROCEDURE — 76700 US EXAM ABDOM COMPLETE: CPT | Performed by: RADIOLOGY

## 2024-03-25 ENCOUNTER — APPOINTMENT (OUTPATIENT)
Dept: INTEGRATIVE MEDICINE | Facility: CLINIC | Age: 34
End: 2024-03-25

## 2024-03-27 ENCOUNTER — HOSPITAL ENCOUNTER (OUTPATIENT)
Dept: RADIOLOGY | Facility: CLINIC | Age: 34
Discharge: HOME | End: 2024-03-27
Payer: COMMERCIAL

## 2024-03-27 ENCOUNTER — OFFICE VISIT (OUTPATIENT)
Dept: PRIMARY CARE | Facility: CLINIC | Age: 34
End: 2024-03-27
Payer: COMMERCIAL

## 2024-03-27 ENCOUNTER — LAB (OUTPATIENT)
Dept: LAB | Facility: LAB | Age: 34
End: 2024-03-27
Payer: COMMERCIAL

## 2024-03-27 VITALS
BODY MASS INDEX: 35.19 KG/M2 | WEIGHT: 205 LBS | DIASTOLIC BLOOD PRESSURE: 89 MMHG | TEMPERATURE: 98 F | SYSTOLIC BLOOD PRESSURE: 137 MMHG | HEART RATE: 87 BPM

## 2024-03-27 DIAGNOSIS — M79.662 BILATERAL CALF PAIN: ICD-10-CM

## 2024-03-27 DIAGNOSIS — Z00.00 ANNUAL PHYSICAL EXAM: ICD-10-CM

## 2024-03-27 DIAGNOSIS — M79.662 BILATERAL CALF PAIN: Primary | ICD-10-CM

## 2024-03-27 DIAGNOSIS — M79.661 BILATERAL CALF PAIN: ICD-10-CM

## 2024-03-27 DIAGNOSIS — M79.661 BILATERAL CALF PAIN: Primary | ICD-10-CM

## 2024-03-27 LAB
ALBUMIN SERPL BCP-MCNC: 4.2 G/DL (ref 3.4–5)
ALP SERPL-CCNC: 43 U/L (ref 33–110)
ALT SERPL W P-5'-P-CCNC: 19 U/L (ref 7–45)
ANION GAP SERPL CALC-SCNC: 12 MMOL/L (ref 10–20)
AST SERPL W P-5'-P-CCNC: 19 U/L (ref 9–39)
BILIRUB SERPL-MCNC: 0.3 MG/DL (ref 0–1.2)
BUN SERPL-MCNC: 13 MG/DL (ref 6–23)
CALCIUM SERPL-MCNC: 9.7 MG/DL (ref 8.6–10.6)
CHLORIDE SERPL-SCNC: 105 MMOL/L (ref 98–107)
CK SERPL-CCNC: 79 U/L (ref 0–215)
CO2 SERPL-SCNC: 27 MMOL/L (ref 21–32)
CREAT SERPL-MCNC: 0.83 MG/DL (ref 0.5–1.05)
D DIMER PPP FEU-MCNC: <215 NG/ML FEU
EGFRCR SERPLBLD CKD-EPI 2021: >90 ML/MIN/1.73M*2
ERYTHROCYTE [DISTWIDTH] IN BLOOD BY AUTOMATED COUNT: 12.2 % (ref 11.5–14.5)
ERYTHROCYTE [SEDIMENTATION RATE] IN BLOOD BY WESTERGREN METHOD: 37 MM/H (ref 0–20)
GLUCOSE SERPL-MCNC: 88 MG/DL (ref 74–99)
HCT VFR BLD AUTO: 40.9 % (ref 36–46)
HGB BLD-MCNC: 13 G/DL (ref 12–16)
MCH RBC QN AUTO: 30.7 PG (ref 26–34)
MCHC RBC AUTO-ENTMCNC: 31.8 G/DL (ref 32–36)
MCV RBC AUTO: 97 FL (ref 80–100)
NRBC BLD-RTO: 0 /100 WBCS (ref 0–0)
PLATELET # BLD AUTO: 365 X10*3/UL (ref 150–450)
POTASSIUM SERPL-SCNC: 4 MMOL/L (ref 3.5–5.3)
PROT SERPL-MCNC: 7.4 G/DL (ref 6.4–8.2)
RBC # BLD AUTO: 4.24 X10*6/UL (ref 4–5.2)
RHEUMATOID FACT SER NEPH-ACNC: <10 IU/ML (ref 0–15)
SODIUM SERPL-SCNC: 140 MMOL/L (ref 136–145)
URATE SERPL-MCNC: 5.3 MG/DL (ref 2.3–6.7)
WBC # BLD AUTO: 9.2 X10*3/UL (ref 4.4–11.3)

## 2024-03-27 PROCEDURE — 80053 COMPREHEN METABOLIC PANEL: CPT

## 2024-03-27 PROCEDURE — 99214 OFFICE O/P EST MOD 30 MIN: CPT | Performed by: FAMILY MEDICINE

## 2024-03-27 PROCEDURE — 93971 EXTREMITY STUDY: CPT | Performed by: RADIOLOGY

## 2024-03-27 PROCEDURE — 93970 EXTREMITY STUDY: CPT

## 2024-03-27 PROCEDURE — 82550 ASSAY OF CK (CPK): CPT

## 2024-03-27 PROCEDURE — 85652 RBC SED RATE AUTOMATED: CPT

## 2024-03-27 PROCEDURE — 36415 COLL VENOUS BLD VENIPUNCTURE: CPT

## 2024-03-27 PROCEDURE — 86235 NUCLEAR ANTIGEN ANTIBODY: CPT

## 2024-03-27 PROCEDURE — 86038 ANTINUCLEAR ANTIBODIES: CPT

## 2024-03-27 PROCEDURE — 86225 DNA ANTIBODY NATIVE: CPT

## 2024-03-27 PROCEDURE — 84550 ASSAY OF BLOOD/URIC ACID: CPT

## 2024-03-27 PROCEDURE — 85379 FIBRIN DEGRADATION QUANT: CPT

## 2024-03-27 PROCEDURE — 85027 COMPLETE CBC AUTOMATED: CPT

## 2024-03-27 PROCEDURE — 86431 RHEUMATOID FACTOR QUANT: CPT

## 2024-03-27 NOTE — PROGRESS NOTES
"Subjective   Patient ID: Caroline Bauer is a 33 y.o. female who presents for pain in both calves.  HPI  The patient is a 32 yo A female with a history of anxiety, s/p right minimally invasive follicular papillary thyroid carcinoma s/p right thyroidectomy \"20 here for the evaluation of a new onset of bilateral calf tightness, right anterior thigh reticular discoloration or erythema and severe leg cramps that started about 3 days.  She had a long ride last week.  No associated chest pain or SOB.     A review of system was completed.  All systems were reviewed and were normal, except for the ones that are listed in the HPI.    Objective   Physical Exam  Constitutional:       Appearance: Normal appearance.   HENT:      Head: Normocephalic and atraumatic.      Right Ear: Tympanic membrane, ear canal and external ear normal.      Left Ear: Tympanic membrane, ear canal and external ear normal.      Nose: Nose normal.      Mouth/Throat:      Mouth: Mucous membranes are moist.      Pharynx: Oropharynx is clear.   Eyes:      Extraocular Movements: Extraocular movements intact.      Conjunctiva/sclera: Conjunctivae normal.      Pupils: Pupils are equal, round, and reactive to light.   Cardiovascular:      Rate and Rhythm: Normal rate and regular rhythm.      Pulses: Normal pulses.   Pulmonary:      Effort: Pulmonary effort is normal.      Breath sounds: Normal breath sounds.   Abdominal:      General: Abdomen is flat. Bowel sounds are normal.      Palpations: Abdomen is soft.   Musculoskeletal:         General: Normal range of motion.      Cervical back: Normal range of motion and neck supple.      Comments: Bilateral calf tenderness to a palpation.   Skin:     General: Skin is warm.   Neurological:      General: No focal deficit present.      Mental Status: She is alert and oriented to person, place, and time. Mental status is at baseline.   Psychiatric:         Mood and Affect: Mood normal.         Behavior: Behavior " normal.         Thought Content: Thought content normal.         Judgment: Judgment normal.     Assessment/Plan   Problem List Items Addressed This Visit       Bilateral calf pain - Primary     -venous doppler US STAT ordered.  -Blood test ordered.  -Tylenol PRN for now.         Relevant Orders    Creatine Kinase    D-dimer, quantitative    Vascular US Lower Extremity Venous Duplex Bilateral    Arthritis Panel (CMS)     Other Visit Diagnoses       Annual physical exam        Relevant Orders    Comprehensive Metabolic Panel    CBC         Patient to return to office after the tests are completed.

## 2024-03-29 LAB
ANA PATTERN: ABNORMAL
ANA SER QL HEP2 SUBST: POSITIVE
ANA TITR SER IF: ABNORMAL {TITER}
CENTROMERE B AB SER-ACNC: <0.2 AI
CHROMATIN AB SERPL-ACNC: <0.2 AI
DSDNA AB SER-ACNC: <1 IU/ML
ENA JO1 AB SER QL IA: <0.2 AI
ENA RNP AB SER IA-ACNC: 0.5 AI
ENA SCL70 AB SER QL IA: <0.2 AI
ENA SM AB SER IA-ACNC: <0.2 AI
ENA SM+RNP AB SER QL IA: <0.2 AI
ENA SS-A AB SER IA-ACNC: <0.2 AI
ENA SS-B AB SER IA-ACNC: <0.2 AI
RIBOSOMAL P AB SER-ACNC: <0.2 AI

## 2024-04-01 ENCOUNTER — APPOINTMENT (OUTPATIENT)
Dept: VASCULAR MEDICINE | Facility: CLINIC | Age: 34
End: 2024-04-01
Payer: COMMERCIAL

## 2024-04-09 ENCOUNTER — APPOINTMENT (OUTPATIENT)
Dept: RHEUMATOLOGY | Facility: CLINIC | Age: 34
End: 2024-04-09
Payer: COMMERCIAL

## 2024-04-10 ENCOUNTER — APPOINTMENT (OUTPATIENT)
Dept: PRIMARY CARE | Facility: CLINIC | Age: 34
End: 2024-04-10
Payer: COMMERCIAL

## 2024-04-11 ENCOUNTER — APPOINTMENT (OUTPATIENT)
Dept: PRIMARY CARE | Facility: CLINIC | Age: 34
End: 2024-04-11
Payer: COMMERCIAL

## 2024-04-11 ENCOUNTER — TELEMEDICINE (OUTPATIENT)
Dept: PRIMARY CARE | Facility: CLINIC | Age: 34
End: 2024-04-11
Payer: COMMERCIAL

## 2024-04-11 DIAGNOSIS — K14.6 BURNING MOUTH SYNDROME: Primary | ICD-10-CM

## 2024-04-11 DIAGNOSIS — L85.3 XEROSIS CUTIS: ICD-10-CM

## 2024-04-11 DIAGNOSIS — E03.9 ACQUIRED HYPOTHYROIDISM: ICD-10-CM

## 2024-04-11 DIAGNOSIS — C73 PAPILLARY THYROID CARCINOMA (MULTI): ICD-10-CM

## 2024-04-11 DIAGNOSIS — E88.819 INSULIN RESISTANCE: ICD-10-CM

## 2024-04-11 PROBLEM — H92.02 LEFT EAR PAIN: Status: RESOLVED | Noted: 2023-03-24 | Resolved: 2024-04-11

## 2024-04-11 PROBLEM — G89.29 CHRONIC LLQ PAIN: Status: RESOLVED | Noted: 2023-03-24 | Resolved: 2024-04-11

## 2024-04-11 PROBLEM — T66.XXXA RADIATION SICKNESS, UNSPECIFIED, INITIAL ENCOUNTER: Status: RESOLVED | Noted: 2021-12-24 | Resolved: 2024-04-11

## 2024-04-11 PROBLEM — L02.214 ABSCESS OF GROIN: Status: RESOLVED | Noted: 2023-03-24 | Resolved: 2024-04-11

## 2024-04-11 PROBLEM — R42 DIZZINESS: Status: RESOLVED | Noted: 2023-03-24 | Resolved: 2024-04-11

## 2024-04-11 PROBLEM — M54.2 NECK PAIN: Status: RESOLVED | Noted: 2023-03-24 | Resolved: 2024-04-11

## 2024-04-11 PROBLEM — N64.9 BREAST LESION: Status: RESOLVED | Noted: 2023-03-24 | Resolved: 2024-04-11

## 2024-04-11 PROBLEM — R27.8 MUSCULAR INCOORDINATION: Status: RESOLVED | Noted: 2023-05-29 | Resolved: 2024-04-11

## 2024-04-11 PROBLEM — S93.401A SPRAIN OF RIGHT ANKLE: Status: RESOLVED | Noted: 2023-09-24 | Resolved: 2024-04-11

## 2024-04-11 PROBLEM — H92.03 ACUTE OTALGIA, BILATERAL: Status: RESOLVED | Noted: 2023-03-24 | Resolved: 2024-04-11

## 2024-04-11 PROBLEM — M54.2 NECK PAIN ON RIGHT SIDE: Status: RESOLVED | Noted: 2023-12-20 | Resolved: 2024-04-11

## 2024-04-11 PROBLEM — R10.84 GENERALIZED ABDOMINAL PAIN: Status: RESOLVED | Noted: 2023-03-24 | Resolved: 2024-04-11

## 2024-04-11 PROBLEM — R39.11 URINARY HESITANCY: Status: RESOLVED | Noted: 2023-05-29 | Resolved: 2024-04-11

## 2024-04-11 PROBLEM — T14.8XXA CONTUSION: Status: RESOLVED | Noted: 2023-09-24 | Resolved: 2024-04-11

## 2024-04-11 PROBLEM — R39.89: Status: RESOLVED | Noted: 2023-05-29 | Resolved: 2024-04-11

## 2024-04-11 PROBLEM — S29.9XXA TRAUMA OF CHEST: Status: RESOLVED | Noted: 2023-03-24 | Resolved: 2024-04-11

## 2024-04-11 PROBLEM — S16.1XXA CERVICAL STRAIN, ACUTE: Status: RESOLVED | Noted: 2023-09-24 | Resolved: 2024-04-11

## 2024-04-11 PROBLEM — M46.1 SACROILIITIS (CMS-HCC): Status: RESOLVED | Noted: 2023-09-24 | Resolved: 2024-04-11

## 2024-04-11 PROBLEM — M25.519 PAIN OF SHOULDER AFTER TRAUMA: Status: RESOLVED | Noted: 2023-03-24 | Resolved: 2024-04-11

## 2024-04-11 PROBLEM — N64.4 PAIN OF LEFT BREAST: Status: RESOLVED | Noted: 2023-09-24 | Resolved: 2024-04-11

## 2024-04-11 PROBLEM — M25.50 ARTHRALGIA OF MULTIPLE JOINTS: Status: RESOLVED | Noted: 2023-09-24 | Resolved: 2024-04-11

## 2024-04-11 PROBLEM — K62.89: Status: RESOLVED | Noted: 2023-03-24 | Resolved: 2024-04-11
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
## 2024-04-11 PROBLEM — R91.8 OTHER NONSPECIFIC ABNORMAL FINDING OF LUNG FIELD: Status: RESOLVED | Noted: 2022-11-08 | Resolved: 2024-04-11

## 2024-04-11 PROBLEM — R20.2 PARESTHESIA: Status: RESOLVED | Noted: 2023-09-24 | Resolved: 2024-04-11

## 2024-04-11 PROBLEM — T66.XXXA ADVERSE EFFECT OF RADIATION: Status: RESOLVED | Noted: 2024-02-29 | Resolved: 2024-04-11

## 2024-04-11 PROBLEM — M25.579 ACUTE ANKLE PAIN: Status: RESOLVED | Noted: 2024-02-29 | Resolved: 2024-04-11

## 2024-04-11 PROBLEM — K52.9 ENTERITIS: Status: RESOLVED | Noted: 2023-05-30 | Resolved: 2024-04-11

## 2024-04-11 PROBLEM — K14.1 GEOGRAPHIC TONGUE: Status: RESOLVED | Noted: 2023-03-24 | Resolved: 2024-04-11

## 2024-04-11 PROBLEM — R13.10 DYSPHAGIA: Status: RESOLVED | Noted: 2021-12-24 | Resolved: 2024-04-11

## 2024-04-11 PROBLEM — R59.9 ENLARGED LYMPH NODE: Status: RESOLVED | Noted: 2023-03-24 | Resolved: 2024-04-11

## 2024-04-11 PROBLEM — M54.50 CHRONIC LOW BACK PAIN: Status: RESOLVED | Noted: 2023-03-24 | Resolved: 2024-04-11

## 2024-04-11 PROBLEM — Z85.850 PERSONAL HISTORY OF MALIGNANT NEOPLASM OF THYROID: Status: RESOLVED | Noted: 2023-02-13 | Resolved: 2024-04-11

## 2024-04-11 PROBLEM — K12.1 STOMATITIS: Status: RESOLVED | Noted: 2023-04-28 | Resolved: 2024-04-11

## 2024-04-11 PROBLEM — A09 TRAVELER'S DIARRHEA: Status: RESOLVED | Noted: 2023-03-29 | Resolved: 2024-04-11

## 2024-04-11 PROBLEM — L50.9 HIVES: Status: RESOLVED | Noted: 2023-03-29 | Resolved: 2024-04-11

## 2024-04-11 PROBLEM — R10.12 LUQ ABDOMINAL PAIN: Status: RESOLVED | Noted: 2024-03-06 | Resolved: 2024-04-11

## 2024-04-11 PROBLEM — S06.0X9A CONCUSSION WITH LOSS OF CONSCIOUSNESS: Status: RESOLVED | Noted: 2023-03-24 | Resolved: 2024-04-11

## 2024-04-11 PROBLEM — K21.9 GASTRIC REFLUX: Status: RESOLVED | Noted: 2023-03-24 | Resolved: 2024-04-11

## 2024-04-11 PROBLEM — M79.662 BILATERAL CALF PAIN: Status: RESOLVED | Noted: 2024-03-27 | Resolved: 2024-04-11

## 2024-04-11 PROBLEM — M94.0 COSTOCHONDRITIS: Status: RESOLVED | Noted: 2023-09-24 | Resolved: 2024-04-11

## 2024-04-11 PROBLEM — R10.32 CHRONIC LLQ PAIN: Status: RESOLVED | Noted: 2023-03-24 | Resolved: 2024-04-11

## 2024-04-11 PROBLEM — R59.0 ANTERIOR CERVICAL ADENOPATHY: Status: RESOLVED | Noted: 2023-03-24 | Resolved: 2024-04-11

## 2024-04-11 PROBLEM — G51.4 EYELID MYOKYMIA: Status: RESOLVED | Noted: 2023-03-24 | Resolved: 2024-04-11

## 2024-04-11 PROBLEM — R51.9 RIGHT-SIDED HEADACHE: Status: RESOLVED | Noted: 2023-12-06 | Resolved: 2024-04-11

## 2024-04-11 PROBLEM — M25.579 PAIN IN JOINT INVOLVING ANKLE AND FOOT: Status: RESOLVED | Noted: 2023-03-24 | Resolved: 2024-04-11

## 2024-04-11 PROBLEM — R16.0 LIVER MASS: Status: RESOLVED | Noted: 2023-03-24 | Resolved: 2024-04-11

## 2024-04-11 PROBLEM — N30.01 ACUTE CYSTITIS WITH HEMATURIA: Status: RESOLVED | Noted: 2024-03-06 | Resolved: 2024-04-11

## 2024-04-11 PROBLEM — R07.9 LEFT-SIDED CHEST PAIN: Status: RESOLVED | Noted: 2023-03-24 | Resolved: 2024-04-11

## 2024-04-11 PROBLEM — Z98.890 HISTORY OF ENDOSCOPY: Status: RESOLVED | Noted: 2023-05-30 | Resolved: 2024-04-11

## 2024-04-11 PROBLEM — S79.912A INJURY OF LEFT HIP REGION: Status: RESOLVED | Noted: 2023-03-24 | Resolved: 2024-04-11

## 2024-04-11 PROBLEM — R10.9 LEFT FLANK PAIN: Status: RESOLVED | Noted: 2023-03-24 | Resolved: 2024-04-11

## 2024-04-11 PROBLEM — R25.3 EYELID TWITCH: Status: RESOLVED | Noted: 2024-02-29 | Resolved: 2024-04-11

## 2024-04-11 PROBLEM — M25.579 CHRONIC ANKLE PAIN: Status: RESOLVED | Noted: 2024-02-29 | Resolved: 2024-04-11

## 2024-04-11 PROBLEM — N89.8 VAGINAL DISCHARGE: Status: RESOLVED | Noted: 2023-03-24 | Resolved: 2024-04-11

## 2024-04-11 PROBLEM — M25.572 PAIN IN LEFT ANKLE AND JOINTS OF LEFT FOOT: Status: RESOLVED | Noted: 2023-03-24 | Resolved: 2024-04-11

## 2024-04-11 PROBLEM — L02.214 INGUINAL ABSCESS: Status: RESOLVED | Noted: 2023-03-24 | Resolved: 2024-04-11

## 2024-04-11 PROBLEM — M54.9 BACK PAIN: Status: RESOLVED | Noted: 2023-03-24 | Resolved: 2024-04-11

## 2024-04-11 PROBLEM — S93.402A LEFT ANKLE SPRAIN: Status: RESOLVED | Noted: 2023-03-24 | Resolved: 2024-04-11

## 2024-04-11 PROBLEM — G89.29 CHRONIC LOW BACK PAIN: Status: RESOLVED | Noted: 2023-03-24 | Resolved: 2024-04-11

## 2024-04-11 PROBLEM — H92.09 PAIN OF EAR STRUCTURE: Status: RESOLVED | Noted: 2023-03-24 | Resolved: 2024-04-11

## 2024-04-11 PROBLEM — G89.29 CHRONIC ANKLE PAIN: Status: RESOLVED | Noted: 2024-02-29 | Resolved: 2024-04-11

## 2024-04-11 PROBLEM — R59.0 POSTERIOR CERVICAL ADENOPATHY: Status: RESOLVED | Noted: 2023-09-24 | Resolved: 2024-04-11

## 2024-04-11 PROBLEM — R93.2 ABNORMAL LIVER CT: Status: RESOLVED | Noted: 2023-03-24 | Resolved: 2024-04-11

## 2024-04-11 PROBLEM — G89.29 CHRONIC RIGHT SHOULDER PAIN: Status: RESOLVED | Noted: 2023-12-20 | Resolved: 2024-04-11

## 2024-04-11 PROBLEM — K12.1 RADIATION STOMATITIS: Status: RESOLVED | Noted: 2023-03-24 | Resolved: 2024-04-11

## 2024-04-11 PROBLEM — R51.9 HEADACHE: Status: RESOLVED | Noted: 2023-12-06 | Resolved: 2024-04-11

## 2024-04-11 PROBLEM — H92.02 OTALGIA OF LEFT EAR: Status: RESOLVED | Noted: 2023-03-24 | Resolved: 2024-04-11

## 2024-04-11 PROBLEM — R10.30 LOWER ABDOMINAL PAIN: Status: RESOLVED | Noted: 2023-09-24 | Resolved: 2024-04-11

## 2024-04-11 PROBLEM — R59.0 AXILLARY ADENOPATHY: Status: RESOLVED | Noted: 2023-03-24 | Resolved: 2024-04-11

## 2024-04-11 PROBLEM — M79.602 PAIN IN LEFT ARM: Status: RESOLVED | Noted: 2023-05-23 | Resolved: 2024-04-11

## 2024-04-11 PROBLEM — M25.50 ARTHRALGIA: Status: RESOLVED | Noted: 2024-02-29 | Resolved: 2024-04-11

## 2024-04-11 PROBLEM — L02.11 NECK ABSCESS: Status: RESOLVED | Noted: 2023-09-24 | Resolved: 2024-04-11

## 2024-04-11 PROBLEM — J06.9 ACUTE URI: Status: RESOLVED | Noted: 2023-05-16 | Resolved: 2024-04-11

## 2024-04-11 PROBLEM — M79.661 BILATERAL CALF PAIN: Status: RESOLVED | Noted: 2024-03-27 | Resolved: 2024-04-11

## 2024-04-11 PROBLEM — R53.82 CHRONIC FATIGUE: Status: RESOLVED | Noted: 2023-03-24 | Resolved: 2024-04-11

## 2024-04-11 PROBLEM — R93.3 ABNORMAL FINDING ON GI TRACT IMAGING: Status: RESOLVED | Noted: 2023-05-30 | Resolved: 2024-04-11

## 2024-04-11 PROBLEM — R91.8 MULTIPLE PULMONARY NODULES: Status: RESOLVED | Noted: 2023-03-24 | Resolved: 2024-04-11

## 2024-04-11 PROBLEM — S09.90XA HEAD TRAUMA: Status: RESOLVED | Noted: 2023-03-24 | Resolved: 2024-04-11

## 2024-04-11 PROBLEM — K76.9 LIVER DISEASE, UNSPECIFIED: Status: RESOLVED | Noted: 2022-11-08 | Resolved: 2024-04-11

## 2024-04-11 PROBLEM — S59.909A ELBOW INJURY: Status: RESOLVED | Noted: 2023-03-24 | Resolved: 2024-04-11

## 2024-04-11 PROBLEM — M25.511 CHRONIC RIGHT SHOULDER PAIN: Status: RESOLVED | Noted: 2023-12-20 | Resolved: 2024-04-11

## 2024-04-11 PROBLEM — S05.00XA CORNEAL ABRASION: Status: RESOLVED | Noted: 2023-09-24 | Resolved: 2024-04-11

## 2024-04-11 PROBLEM — K13.70 MOUTH LESION: Status: RESOLVED | Noted: 2024-01-21 | Resolved: 2024-04-11

## 2024-04-11 PROBLEM — T81.49XA POSTOPERATIVE ABSCESS: Status: RESOLVED | Noted: 2023-09-24 | Resolved: 2024-04-11

## 2024-04-11 PROBLEM — L57.9 SKIN CHANGES DUE TO CHRONIC EXPOSURE TO NONIONIZING RADIATION, UNSPECIFIED: Status: RESOLVED | Noted: 2023-05-15 | Resolved: 2024-04-11

## 2024-04-11 PROBLEM — S80.02XA TRAUMATIC ECCHYMOSIS OF LEFT KNEE: Status: RESOLVED | Noted: 2023-03-24 | Resolved: 2024-04-11

## 2024-04-11 PROBLEM — F41.9 ANXIETY: Status: RESOLVED | Noted: 2023-09-24 | Resolved: 2024-04-11

## 2024-04-11 PROBLEM — M26.69 INFLAMMATION OF TEMPOROMANDIBULAR JOINT: Status: RESOLVED | Noted: 2023-03-24 | Resolved: 2024-04-11

## 2024-04-11 PROCEDURE — 99214 OFFICE O/P EST MOD 30 MIN: CPT | Performed by: FAMILY MEDICINE

## 2024-04-11 NOTE — PROGRESS NOTES
"Subjective   Patient ID: Caroline Bauer is a 33 y.o. 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    A review of system was completed.  All systems were reviewed and were normal, except for the ones that are listed in the HPI.    Objective   Physical Exam    Assessment/Plan   Problem List Items Addressed This Visit       Hypothyroidism    Insulin resistance    Papillary thyroid carcinoma (CMS/HCC)    RESOLVED: Burning mouth syndrome - Primary    RESOLVED: Xerosis cutis   Patient problem list and medication list updated with the patient today.   Patient to return to office in 4 to 6 months.  "

## 2024-04-15 ENCOUNTER — APPOINTMENT (OUTPATIENT)
Dept: INTEGRATIVE MEDICINE | Facility: CLINIC | Age: 34
End: 2024-04-15

## 2024-04-16 ENCOUNTER — OFFICE VISIT (OUTPATIENT)
Dept: RHEUMATOLOGY | Facility: CLINIC | Age: 34
End: 2024-04-16
Payer: COMMERCIAL

## 2024-04-16 ENCOUNTER — LAB (OUTPATIENT)
Dept: LAB | Facility: LAB | Age: 34
End: 2024-04-16
Payer: COMMERCIAL

## 2024-04-16 VITALS
SYSTOLIC BLOOD PRESSURE: 138 MMHG | BODY MASS INDEX: 35.85 KG/M2 | HEIGHT: 64 IN | DIASTOLIC BLOOD PRESSURE: 80 MMHG | HEART RATE: 77 BPM | WEIGHT: 210 LBS

## 2024-04-16 DIAGNOSIS — R76.8 POSITIVE ANA (ANTINUCLEAR ANTIBODY): ICD-10-CM

## 2024-04-16 DIAGNOSIS — R23.1 LIVEDO RETICULARIS: ICD-10-CM

## 2024-04-16 DIAGNOSIS — M25.50 ARTHRALGIA, UNSPECIFIED JOINT: ICD-10-CM

## 2024-04-16 DIAGNOSIS — R76.8 POSITIVE ANA (ANTINUCLEAR ANTIBODY): Primary | ICD-10-CM

## 2024-04-16 LAB
B2 MICROGLOB SERPL-MCNC: 1.4 MG/L (ref 0.7–2.2)
CARDIOLIPIN IGA SERPL-ACNC: 0.5 APL U/ML
CARDIOLIPIN IGG SER IA-ACNC: <1.6 GPL U/ML
CARDIOLIPIN IGM SER IA-ACNC: 0.2 MPL U/ML
CRP SERPL-MCNC: 1.23 MG/DL
ERYTHROCYTE [SEDIMENTATION RATE] IN BLOOD BY WESTERGREN METHOD: 17 MM/H (ref 0–20)

## 2024-04-16 PROCEDURE — 36415 COLL VENOUS BLD VENIPUNCTURE: CPT

## 2024-04-16 PROCEDURE — 85652 RBC SED RATE AUTOMATED: CPT

## 2024-04-16 PROCEDURE — 99214 OFFICE O/P EST MOD 30 MIN: CPT | Performed by: INTERNAL MEDICINE

## 2024-04-16 PROCEDURE — 86147 CARDIOLIPIN ANTIBODY EA IG: CPT

## 2024-04-16 PROCEDURE — 1036F TOBACCO NON-USER: CPT | Performed by: INTERNAL MEDICINE

## 2024-04-16 PROCEDURE — 85613 RUSSELL VIPER VENOM DILUTED: CPT

## 2024-04-16 PROCEDURE — 82232 ASSAY OF BETA-2 PROTEIN: CPT

## 2024-04-16 PROCEDURE — 86140 C-REACTIVE PROTEIN: CPT

## 2024-04-16 ASSESSMENT — PAIN SCALES - GENERAL: PAINLEVEL: 4

## 2024-04-16 NOTE — PROGRESS NOTES
Subjective   Patient ID: Caroline Bauer is a 33 y.o. female who presents for Follow-up (Patient was seen 1 year ago. Pain in both legs.).    HPI  34 yo female with polyarticular joint, body pain.  She was diagnosed with thyroid cancer and using levothyroxine treatment.  She reports polyarticular joint pain and some swelling started 18 months.  Her symptoms started after thyroid surgery.  She also endorses AM stiffness more than one hour.  She also reports fascial intermittent rash, oral ulcer, dry eyes, using eye drops, dry mouth,   She denies Raynaud, chest pain, fever or alopecia  Her mother has arthritis?, her aunt has lupus.     Interval history:  She has noted livedo like lesions in her right leg  She does not have any thrombosis  She does not have any miscarriage, pregnancy issue  Also, she denies any active joint pain, swelling now       ROS  Joint pain in hands: negative   Joint swelling: negative  Morning stiffness and duration: negative   strength: normal  Oral ulcer: negative  Genital ulcer: negative  Raynaud phenomenon: negative  Chest pain/dyspnea: negative  Low back pain: negative  Visual problem: negative  Dry eyes/dry mouth: negative  Skin rash/scaling/psoriasis: negative       Objective     PEXAM  VS reviewed, WNL  General: Alert, no distress   HEENT: Normocephalic/atraumatic, No alopecia. PERRLA. Sclera white, conjunctiva pink, no malar rash. no oral or nasal ulcer. Oral cavity pink and moist, no erythema or exudate, dentition good.   Neck: supple  Respiratory: CTA B, no adventitious breath sounds  Cardiac: RRR, no murmurs, carotid, or bruits  Abdominal: symmetrical, soft, non-tender, non-distended, normoactive BSx4 quadrants, no CVA tenderness or suprapubic tenderness  MSK: Joints of upper and lower extremities were assessed for synovitis and ROM.    Today she has no evidence of synovitis in the joints of her hands or wrists, tender joint count 0, swollen joint count 0   Extremities: no  clubbing, no cyanosis, no edema  Skin: Skin warm and moist.   Neuro: non-focal, Strength 5/5 throughout. Normal gait. No cerebellar pathologic exam     Assessment/Plan   34 yo female with thyroid cancer.  She came to us for generalized body pain started after her thyroid surgery.  Also, she reports sicca symptoms, oral ulcer.  Her HOLLI, RF were negative one year ago.  PExam did not reveal any active synovitis, it showed only livedo like skin lesions in her left upper legs.  No h/o thrombosis or pregnancy complications  Her repeat tests showed weak positive Holli (1/80), neg MIKAL, and rF  -will see her APS antibodies  -will see her in 12 months

## 2024-04-18 LAB
DRVVT SCREEN TO CONFIRM RATIO: 1.28 RATIO
DRVVT/DRVVT CFM NRMLZD PPP-RTO: 1.08 RATIO
DRVVT/DRVVT CFM P DOAC NEUT NORM PPP-RTO: 1.18 RATIO

## 2024-04-30 ENCOUNTER — APPOINTMENT (OUTPATIENT)
Dept: PULMONOLOGY | Facility: HOSPITAL | Age: 34
End: 2024-04-30
Payer: COMMERCIAL

## 2024-06-12 ENCOUNTER — APPOINTMENT (OUTPATIENT)
Dept: OTOLARYNGOLOGY | Facility: HOSPITAL | Age: 34
End: 2024-06-12
Payer: COMMERCIAL

## 2024-06-12 ENCOUNTER — OFFICE VISIT (OUTPATIENT)
Dept: OTOLARYNGOLOGY | Facility: HOSPITAL | Age: 34
End: 2024-06-12
Payer: COMMERCIAL

## 2024-06-12 VITALS — TEMPERATURE: 96.1 F | BODY MASS INDEX: 34.31 KG/M2 | HEIGHT: 64 IN | WEIGHT: 201 LBS

## 2024-06-12 DIAGNOSIS — J30.89 NON-SEASONAL ALLERGIC RHINITIS, UNSPECIFIED TRIGGER: ICD-10-CM

## 2024-06-12 DIAGNOSIS — E03.9 ACQUIRED HYPOTHYROIDISM: Primary | ICD-10-CM

## 2024-06-12 DIAGNOSIS — C73 PRIMARY MALIGNANT NEOPLASM OF THYROID GLAND (MULTI): ICD-10-CM

## 2024-06-12 PROCEDURE — 99213 OFFICE O/P EST LOW 20 MIN: CPT | Performed by: OTOLARYNGOLOGY

## 2024-06-12 RX ORDER — IBUPROFEN 800 MG/1
1 TABLET ORAL EVERY 6 HOURS
COMMUNITY
Start: 2024-03-07

## 2024-06-12 RX ORDER — METFORMIN HYDROCHLORIDE 500 MG/1
1000 TABLET, EXTENDED RELEASE ORAL
COMMUNITY
Start: 2024-05-09

## 2024-06-13 RX ORDER — FLUTICASONE PROPIONATE 50 MCG
2 SPRAY, SUSPENSION (ML) NASAL DAILY
Qty: 16 G | Refills: 2 | Status: SHIPPED | OUTPATIENT
Start: 2024-06-13

## 2024-06-17 ENCOUNTER — APPOINTMENT (OUTPATIENT)
Dept: PRIMARY CARE | Facility: CLINIC | Age: 34
End: 2024-06-17
Payer: COMMERCIAL

## 2024-06-17 VITALS
SYSTOLIC BLOOD PRESSURE: 131 MMHG | TEMPERATURE: 97.3 F | BODY MASS INDEX: 35.19 KG/M2 | HEART RATE: 73 BPM | DIASTOLIC BLOOD PRESSURE: 85 MMHG | WEIGHT: 205 LBS

## 2024-06-17 DIAGNOSIS — S83.92XA SPRAIN OF LEFT KNEE, UNSPECIFIED LIGAMENT, INITIAL ENCOUNTER: Primary | ICD-10-CM

## 2024-06-17 PROCEDURE — 1036F TOBACCO NON-USER: CPT | Performed by: FAMILY MEDICINE

## 2024-06-17 PROCEDURE — 99214 OFFICE O/P EST MOD 30 MIN: CPT | Performed by: FAMILY MEDICINE

## 2024-06-17 ASSESSMENT — PAIN SCALES - GENERAL: PAINLEVEL: 4

## 2024-06-17 NOTE — PROGRESS NOTES
"Subjective   Patient ID: Caroline Bauer is a 33 y.o. female who presents for Follow-up (Left knee pain).  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     A review of system was completed.  All systems were reviewed and were normal, except for the ones that are listed in the HPI.    Objective   Physical Exam  Constitutional:       Appearance: Normal appearance.   HENT:      Head: Normocephalic and atraumatic.      Right Ear: Tympanic membrane, ear canal and external ear normal.      Left Ear: Tympanic membrane, ear canal and external ear normal.      Nose: Nose normal.      Mouth/Throat:      Mouth: Mucous membranes are moist.      Pharynx: Oropharynx is clear.   Eyes:      Extraocular Movements: Extraocular movements intact.      Conjunctiva/sclera: Conjunctivae normal.      Pupils: Pupils are equal, round, and reactive to light.   Cardiovascular:      Rate and Rhythm: Normal rate and regular rhythm.      Pulses: Normal pulses.   Pulmonary:      Effort: Pulmonary effort is normal.      Breath sounds: Normal breath sounds.   Abdominal:      General: Abdomen is flat. Bowel sounds are normal.      Palpations: Abdomen is soft.   Musculoskeletal:         General: Normal range of motion.      Cervical back: Normal range of motion and neck supple.   Skin:     General: Skin is warm.   Neurological:      General: No focal deficit present.      Mental Status: She is alert and oriented to person, place, and time. Mental status is at baseline.   Psychiatric:         Mood and Affect: Mood normal.         Behavior: Behavior normal.         Thought Content: Thought content normal.         Judgment: Judgment normal.     Assessment/Plan   Problem List Items Addressed This Visit       Sprain of left knee - Primary     -Aleve OTC " BID for 2 weeks then PRN.  -Left knee X-ray ordered.          Patient to return to office in 2- 4 weeks if not better.

## 2024-06-18 ENCOUNTER — OFFICE VISIT (OUTPATIENT)
Dept: DERMATOLOGY | Facility: CLINIC | Age: 34
End: 2024-06-18
Payer: COMMERCIAL

## 2024-06-18 DIAGNOSIS — D48.5 NEOPLASM OF UNCERTAIN BEHAVIOR OF SKIN: ICD-10-CM

## 2024-06-18 DIAGNOSIS — L01.00 IMPETIGO: Primary | ICD-10-CM

## 2024-06-18 PROCEDURE — 87205 SMEAR GRAM STAIN: CPT | Performed by: DERMATOLOGY

## 2024-06-18 RX ORDER — MUPIROCIN 20 MG/G
OINTMENT TOPICAL
Qty: 22 G | Refills: 1 | Status: SHIPPED | OUTPATIENT
Start: 2024-06-18 | End: 2024-06-23

## 2024-06-18 NOTE — PROGRESS NOTES
Subjective     Caroline Bauer is a 33 y.o. female who presents for the following: Suspicious Skin Lesion (Pt concerned with lesion on between nose and mouth for about month; /Pt concerned with mole and lesion on left posterior upper lower extremity. ).     Last derm visit 5/2023 with Dr. Botello - acne on face Rx benzoyl peroxide 5% wash, clindamycin lotion. Prior visit 2021 diagnosed with seb derm ketoconazole 2% shampoo, fluocinolone 0.01% solution, and ketoconazole 2% cream    Review of Systems:  No other skin or systemic complaints other than what is documented elsewhere in the note.    The following portions of the chart were reviewed this encounter and updated as appropriate:          Skin Cancer History  No skin cancer on file.      Specialty Problems          Dermatology Problems    Acne vulgaris    Seborrheic dermatitis, unspecified        Objective   Well appearing patient in no apparent distress; mood and affect are within normal limits.    A focused skin examination was performed. All findings within normal limits unless otherwise noted below.    Assessment/Plan   1. Impetigo  Right Upper Cutaneous Lip  Solitary pustule on right upper cutaneous lip    Present for 1 month, drained,not resolved. Culture today. Empirically start mupirocin oint    mupirocin (Bactroban) 2 % ointment - Right Upper Cutaneous Lip  Apply topically 3 times a day for 5 days. To spot on upper lip    Specimen A - Tissue/Wound Culture/Smear      2. Neoplasm of uncertain behavior of skin  Left Thigh - Posterior  0.4 cm skin-colored raised papule with focal darker area, causing pain              Shave removal    Lesion length (cm):  0.4  Lesion width (cm):  0.4  Margin per side (cm):  0.1  Lesion diameter (cm):  0.6  Informed consent: discussed and consent obtained    Timeout: patient name, date of birth, surgical site, and procedure verified    Procedure prep:  Patient was prepped and draped  Anesthesia: the lesion was  anesthetized in a standard fashion    Anesthetic:  1% lidocaine w/ epinephrine 1-100,000 local infiltration  Instrument used: DermaBlade    Hemostasis achieved with: aluminum chloride    Outcome: patient tolerated procedure well    Post-procedure details: sterile dressing applied and wound care instructions given    Dressing type: bandage and petrolatum      Staff Communication: Dermatology Local Anesthesia: Lidocaine 0.5% with Epinephrine 1;200,000 - Amount: up to 2 ml    Specimen 1 - Dermatopathology- DERM LAB  Differential Diagnosis: irritatednevus  Check Margins Yes/No?:    Comments:    Dermpath Lab: Routine Histopathology (formalin-fixed tissue)        Follow up as needed

## 2024-06-20 LAB
LABORATORY COMMENT REPORT: NORMAL
PATH REPORT.FINAL DX SPEC: NORMAL
PATH REPORT.GROSS SPEC: NORMAL
PATH REPORT.MICROSCOPIC SPEC OTHER STN: NORMAL
PATH REPORT.RELEVANT HX SPEC: NORMAL
PATH REPORT.TOTAL CANCER: NORMAL

## 2024-06-21 LAB
BACTERIA SPEC CULT: ABNORMAL
GRAM STN SPEC: ABNORMAL
GRAM STN SPEC: ABNORMAL

## 2024-06-23 PROBLEM — E06.5 THYROIDITIS, CHRONIC: Status: RESOLVED | Noted: 2023-03-24 | Resolved: 2024-06-23

## 2024-06-23 PROBLEM — E04.1 THYROID NODULE: Status: RESOLVED | Noted: 2023-09-24 | Resolved: 2024-06-23

## 2024-06-23 ASSESSMENT — ENCOUNTER SYMPTOMS
SHORTNESS OF BREATH: 0
NAUSEA: 0
SORE THROAT: 0
TROUBLE SWALLOWING: 0
STRIDOR: 0
VOICE CHANGE: 0
NECK PAIN: 0
FATIGUE: 0
VOMITING: 0
FEVER: 0
APPETITE CHANGE: 0
FACIAL SWELLING: 0
ADENOPATHY: 0
CHILLS: 0
COUGH: 0
UNEXPECTED WEIGHT CHANGE: 0

## 2024-06-23 NOTE — ASSESSMENT & PLAN NOTE
Well controlled  Follows with endocrinology, Dr. Naranjo at [facility]   Continue daily synthroid

## 2024-06-23 NOTE — PROGRESS NOTES
Subjective   Patient ID: Caroline Bauer is a 33 y.o. female who presents for Follow-up (Mouth lesion).  Last seen 1/24.  Today is a routine follow up.  Her mouth lesion has not returned.  In addition she has a history of a right +minimally invasive follicular variant of papillary thyroid carcinoma, 2cm s/p right thyroidectomy 1/3/20. She is s/p left completion hemithyroidectomy on 9/4/20.  Last labwork TG 5/24 0.1.  TSH 1.  She is currently following with endocrinology Dr. Naranjo at [facility].  No other ENT concerns today.    HPI    Review of Systems   Constitutional:  Negative for appetite change, chills, fatigue, fever and unexpected weight change.   HENT:  Negative for dental problem, drooling, ear pain, facial swelling, hearing loss, mouth sores, sore throat, tinnitus, trouble swallowing and voice change.    Respiratory:  Negative for cough, shortness of breath and stridor.    Gastrointestinal:  Negative for nausea and vomiting.   Musculoskeletal:  Negative for neck pain.   Hematological:  Negative for adenopathy.   All other systems reviewed and are negative.      Objective   Physical Exam  HENT:      Head: Normocephalic and atraumatic.      Right Ear: Tympanic membrane and ear canal normal.      Left Ear: Tympanic membrane and ear canal normal.      Nose: Nose normal.      Mouth/Throat:      Mouth: Mucous membranes are moist.      Pharynx: Oropharynx is clear.      Comments: No mouth sores  Eyes:      Conjunctiva/sclera: Conjunctivae normal.   Neck:      Comments: S/p thyroidectomy scar well healed.  No recurrent tissue.  No LAD  Neurological:      Mental Status: She is alert and oriented to person, place, and time.   Psychiatric:         Attention and Perception: Attention normal.         Mood and Affect: Mood normal.         Speech: Speech normal.         Behavior: Behavior normal. Behavior is cooperative.         Assessment/Plan   Problem List Items Addressed This Visit             ICD-10-CM     Hypothyroidism - Primary E03.9     Well controlled  Follows with endocrinology, Dr. Naranjo at [facility]   Continue daily synthroid         Primary malignant neoplasm of thyroid gland (Multi) C73     No evidence of disease  Doing well  TG 0.1, TSH 1  Follow up in 1 year        No mouth sores seen on exam today       Slime Paul MD    Head & Neck Surgical Oncology & Reconstruction  Department of Otolaryngology - Head and Neck Surgery

## 2024-06-24 RX ORDER — GENTAMICIN SULFATE 1 MG/G
OINTMENT TOPICAL
Qty: 15 G | Refills: 0 | Status: SHIPPED | OUTPATIENT
Start: 2024-06-24

## 2024-07-02 ENCOUNTER — APPOINTMENT (OUTPATIENT)
Dept: PRIMARY CARE | Facility: CLINIC | Age: 34
End: 2024-07-02
Payer: COMMERCIAL

## 2024-07-02 ENCOUNTER — TELEMEDICINE (OUTPATIENT)
Dept: PRIMARY CARE | Facility: CLINIC | Age: 34
End: 2024-07-02
Payer: COMMERCIAL

## 2024-07-02 DIAGNOSIS — J20.9 ACUTE BRONCHITIS, UNSPECIFIED ORGANISM: Primary | ICD-10-CM

## 2024-07-02 PROCEDURE — 99213 OFFICE O/P EST LOW 20 MIN: CPT | Performed by: FAMILY MEDICINE

## 2024-07-02 RX ORDER — AZITHROMYCIN 250 MG/1
TABLET, FILM COATED ORAL
Qty: 6 TABLET | Refills: 0 | Status: SHIPPED | OUTPATIENT
Start: 2024-07-02

## 2024-07-02 RX ORDER — AZITHROMYCIN 250 MG/1
TABLET, FILM COATED ORAL
Qty: 6 EACH | Refills: 0 | Status: SHIPPED | OUTPATIENT
Start: 2024-07-02

## 2024-07-02 NOTE — PROGRESS NOTES
"Subjective   Patient ID: Caroline Bauer is a 33 y.o. female who presents for a cough.  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  A review of system was completed.  All systems were reviewed and were normal, except for the ones that are listed in the HPI.    Objective   Physical Exam    Assessment/Plan   Problem List Items Addressed This Visit       Acute bronchitis - Primary     -Z-Jonathan started.         Relevant Medications    azithromycin (Zithromax Z-Jonathan) 250 mg tablet      Patient to return to office in 1 to 2 weeks.  "

## 2024-07-02 NOTE — PROGRESS NOTES
"Subjective   Patient ID: Caroline Bauer is a 33 y.o. female who presents for a cough that started a week ago.    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    A review of system was completed.  All systems were reviewed and were normal, except for the ones that are listed in the HPI.    Objective   Physical Exam    Assessment/Plan   Problem List Items Addressed This Visit    None   Patient to return to office if no improvement noted in 1-2 weeks.  "

## 2024-07-09 ENCOUNTER — APPOINTMENT (OUTPATIENT)
Dept: PULMONOLOGY | Facility: HOSPITAL | Age: 34
End: 2024-07-09
Payer: COMMERCIAL

## 2024-07-10 DIAGNOSIS — J30.89 NON-SEASONAL ALLERGIC RHINITIS, UNSPECIFIED TRIGGER: ICD-10-CM

## 2024-07-10 RX ORDER — FLUTICASONE PROPIONATE 50 MCG
2 SPRAY, SUSPENSION (ML) NASAL DAILY
Qty: 16 G | Refills: 3 | Status: SHIPPED | OUTPATIENT
Start: 2024-07-10 | End: 2024-10-08

## 2024-07-12 ENCOUNTER — TELEMEDICINE (OUTPATIENT)
Dept: PRIMARY CARE | Facility: CLINIC | Age: 34
End: 2024-07-12
Payer: COMMERCIAL

## 2024-07-12 DIAGNOSIS — J20.9 ACUTE BRONCHITIS, UNSPECIFIED ORGANISM: Primary | ICD-10-CM

## 2024-07-12 PROCEDURE — 99214 OFFICE O/P EST MOD 30 MIN: CPT | Performed by: FAMILY MEDICINE

## 2024-07-12 RX ORDER — DOXYCYCLINE 100 MG/1
100 CAPSULE ORAL 2 TIMES DAILY
Qty: 20 CAPSULE | Refills: 0 | Status: SHIPPED | OUTPATIENT
Start: 2024-07-12 | End: 2024-07-22

## 2024-07-12 NOTE — PROGRESS NOTES
"Subjective   Patient ID: Caroline Bauer is a 33 y.o. female who presents for No chief complaint on file..  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    A review of system was completed.  All systems were reviewed and were normal, except for the ones that are listed in the HPI.    Objective   Physical Exam  Constitutional:       Appearance: Normal appearance.   HENT:      Head: Normocephalic and atraumatic.      Right Ear: Tympanic membrane, ear canal and external ear normal.      Left Ear: Tympanic membrane, ear canal and external ear normal.      Nose: Nose normal.      Mouth/Throat:      Mouth: Mucous membranes are moist.      Pharynx: Oropharynx is clear.   Eyes:      Extraocular Movements: Extraocular movements intact.      Conjunctiva/sclera: Conjunctivae normal.      Pupils: Pupils are equal, round, and reactive to light.   Cardiovascular:      Rate and Rhythm: Normal rate and regular rhythm.      Pulses: Normal pulses.   Pulmonary:      Effort: Pulmonary effort is normal.      Breath sounds: Normal breath sounds.   Abdominal:      General: Abdomen is flat. Bowel sounds are normal.      Palpations: Abdomen is soft.   Musculoskeletal:         General: Normal range of motion.      Cervical back: Normal range of motion and neck supple.   Skin:     General: Skin is warm.   Neurological:      General: No focal deficit present.      Mental Status: She is alert and oriented to person, place, and time. Mental status is at baseline.   Psychiatric:         Mood and Affect: Mood normal.         Behavior: Behavior normal.         Thought Content: Thought content normal.         Judgment: Judgment normal.     Assessment/Plan   Problem List Items Addressed This Visit       Acute bronchitis - " Primary     -not improving.  -Z-pack already.  -Doxycycline 100 mg BID for 10 days started today.         Relevant Medications    doxycycline (Vibramycin) 100 mg capsule      Patient to return to office in 2- 4 weeks if not better.

## 2024-07-17 ENCOUNTER — APPOINTMENT (OUTPATIENT)
Dept: OTOLARYNGOLOGY | Facility: HOSPITAL | Age: 34
End: 2024-07-17
Payer: COMMERCIAL

## 2024-07-23 ENCOUNTER — OFFICE VISIT (OUTPATIENT)
Dept: PULMONOLOGY | Facility: HOSPITAL | Age: 34
End: 2024-07-23
Payer: COMMERCIAL

## 2024-07-23 VITALS
SYSTOLIC BLOOD PRESSURE: 115 MMHG | DIASTOLIC BLOOD PRESSURE: 80 MMHG | TEMPERATURE: 97.2 F | HEART RATE: 69 BPM | BODY MASS INDEX: 37.08 KG/M2 | OXYGEN SATURATION: 98 % | WEIGHT: 216 LBS

## 2024-07-23 DIAGNOSIS — R91.8 LUNG NODULES: Primary | ICD-10-CM

## 2024-07-23 DIAGNOSIS — J45.20 MILD INTERMITTENT ASTHMA WITHOUT COMPLICATION (HHS-HCC): ICD-10-CM

## 2024-07-23 PROCEDURE — 99213 OFFICE O/P EST LOW 20 MIN: CPT | Performed by: INTERNAL MEDICINE

## 2024-07-23 RX ORDER — BUDESONIDE AND FORMOTEROL FUMARATE DIHYDRATE 160; 4.5 UG/1; UG/1
2 AEROSOL RESPIRATORY (INHALATION)
Qty: 10.2 G | Refills: 5 | Status: SHIPPED | OUTPATIENT
Start: 2024-07-23 | End: 2025-01-19

## 2024-07-23 SDOH — ECONOMIC STABILITY: FOOD INSECURITY: WITHIN THE PAST 12 MONTHS, THE FOOD YOU BOUGHT JUST DIDN'T LAST AND YOU DIDN'T HAVE MONEY TO GET MORE.: NEVER TRUE

## 2024-07-23 SDOH — ECONOMIC STABILITY: FOOD INSECURITY: WITHIN THE PAST 12 MONTHS, YOU WORRIED THAT YOUR FOOD WOULD RUN OUT BEFORE YOU GOT MONEY TO BUY MORE.: NEVER TRUE

## 2024-07-23 ASSESSMENT — LIFESTYLE VARIABLES
SKIP TO QUESTIONS 9-10: 1
HOW MANY STANDARD DRINKS CONTAINING ALCOHOL DO YOU HAVE ON A TYPICAL DAY: PATIENT DOES NOT DRINK
AUDIT-C TOTAL SCORE: 0
HOW OFTEN DO YOU HAVE SIX OR MORE DRINKS ON ONE OCCASION: NEVER
HOW OFTEN DO YOU HAVE A DRINK CONTAINING ALCOHOL: NEVER

## 2024-07-23 ASSESSMENT — PATIENT HEALTH QUESTIONNAIRE - PHQ9
1. LITTLE INTEREST OR PLEASURE IN DOING THINGS: NOT AT ALL
SUM OF ALL RESPONSES TO PHQ9 QUESTIONS 1 & 2: 0
2. FEELING DOWN, DEPRESSED OR HOPELESS: NOT AT ALL

## 2024-07-23 ASSESSMENT — PAIN SCALES - GENERAL: PAINLEVEL: 5

## 2024-07-23 NOTE — PROGRESS NOTES
Department of Medicine  Division of Pulmonary, Critical Care, and Sleep Medicine  Follow-Up Visit  Date of visit: 07/23/2024  Patient: Caroline Bauer    MRN: [id number]  YOB: 1990   Gender: female  ========================================================================  Reason for this visit  Caroline Bauer is a 33 y.o. female never smoker who presents today for follow-up on  asthma and lung nodule  ========================================================================  IMPRESSION and RECOMMENDATIONS  Ms. Bauer is a 33 y.o. female with the following respiratory problems  Lung nodule - needs follow up  Mild intermittent asthma - sub-optimal control  Weight gain and decreased fitness level  The plan of care is as follows  Chest CT to follow up on lung nodule  Start symbicort 2 puffs twice a day  Albuterol as needed  Re-adjust diet   Start swimming and exercising  Follow up in clinic in 3 months  ========================================================================  Physician HPI (7/23/2024):  Ms. Bauer is a 33 y.o. female known with asthma    Since last visit she stopped exercising.  Unfortunately decided to smoke marijuana a couple of times after which she developed chest congestion, increased shortness of breath and cough.  She was treated by her primary care physician with a Z-Jonathan that failed she has been using inhalers frequently. CAT score at 13  Restarted exercising in the past few weeks  From a respiratory stand point    dyspnea mMRC Grade 2 --- On level ground, walks slower than people of the same age because of breathlessness, or have to stop for breath when walking at own pace  Cough improved sifgnificantly  chest tightness and  wheezing are with minimal activity  Denies fever or chills, night sweats, weight loss or gain  Denies problems swallowing or chocking on food or cough when eating, or reflux.   no chest pain, orthopnea, PND or LE  edema  Snoring, day time somnolence not present  Weight gain about 13 pounds in 3 months  ========================================================================  Review of Pertinent Systems:  Constitutional: no chills, no fever, no night sweats, Fatigue  Eyes: no blurred vision and no dryness of the eyes.   ENT: no nasal congestion, no hoarseness, no sore throat, no postnasal drip, no rhinorrhea, no sinus pressure, no bleeding gums,   Cardiovascular: as per HPI   Respiratory: as per HPI  Gastrointestinal: no nausea and no vomiting. No diarrhea or constipation. No reflux  Musculoskeletal: no arthralgias, no myalgias, no localized joint pain, no joint redness, no joint stiffness, no joint swelling, no limb pain and no limb swelling.   Integumentary: no rashes.    ========================================================================  Physical Examination:  Wt 98 kg (216 lb)   BMI 37.08 kg/m²  Body mass index is 37.08 kg/m².  GENERAL: normal appearance. well nourished. No respiratory distress  HEAD/SINUSES: no sinus tenderness  OROPHARYNX: Moist mucosa, no thrush or lesions - Mallampati III (soft and hard palate and base of uvula visible)  NECK: no JVD, midline trachea without stridor. Thyroid not enlarged  LYMPH NODES : none felt in the cervical, submandibular or supraclavicular regions  LUNGS: Symmetric chest. Good excursion. Equal breath sounds. no wheezing. no crackles or rhonchi  CARDIAC: normal S1 and S2; no gallops, rubs or murmurs. Regular rate and rhythm  EXTREMITIES: No edema, no varicose veins  NEURO: grossly normal mental status, CN reflexes and motor strength.   SKIN: Skin turgor normal. No rashes or lesions.   PSYCH: Normal affect  ========================================================================  Current Medications:  Current Outpatient Medications   Medication Instructions    cyclobenzaprine (Flexeril) 5 mg tablet PLEASE SEE ATTACHED FOR DETAILED DIRECTIONS    Cytomel 5 mcg, oral, Daily     doxycycline (VIBRAMYCIN) 100 mg, oral, 2 times daily, Take with at least 8 ounces (large glass) of water, do not lie down for 30 minutes after    ergocalciferol (Vitamin D-2) 1.25 MG (09240 UT) capsule 1 capsule, oral, Once Weekly    fluticasone (Flonase) 50 mcg/actuation nasal spray 2 sprays, Each Nostril, Daily    gentamicin (Garamycin) 0.1 % ointment Apply to the affected area of lip three to four times per day for 7 days    liothyronine (Cytomel) 5 mcg tablet TAKE 1/2 TABLETS (2.5 MCG) BY MOUTH ONCE DAILY    metFORMIN XR (GLUCOPHAGE-XR) 1,000 mg, oral, Daily RT    Synthroid 137 mcg, oral, Daily before breakfast      ========================================================================  Drug Allergies/Intolerances:  Allergies   Allergen Reactions    Escitalopram Anxiety, Hallucinations, Hives, Other and Palpitations     depression      Disclosure: Parts of this note may have been scribed or generated using voice dictation software, Dragon.  Homophonic errors may exist.  Please contact me directly if clarification is needed  Gurinder Arellano MD  07/23/2024

## 2024-07-23 NOTE — PATIENT INSTRUCTIONS
Thank you for allowing me to participate in your health care today.    The primary respiratory problem that we discussed is uncontrolled asthma and on going weight gain    The plan of care is as follows  Chest CT to follow up on lung nodule  Start symbicort 2 puffs twice a day  Albuterol as needed  Re-adjust diet   Start swimming and exercising  Follow up in clinic in 3 months    Please call [phone] ([facility]) to get your tests scheduled.     Please call [phone] to schedule a follow up appointment with me.    Unless deemed urgent or emergent, the result(s) of any tests ordering during this clinic visit will be reviewed during your follow-up visit. Depending on the urgency of the result(s), I may call or send you a [employer] message.

## 2024-07-31 ENCOUNTER — OFFICE VISIT (OUTPATIENT)
Dept: DERMATOLOGY | Facility: CLINIC | Age: 34
End: 2024-07-31
Payer: COMMERCIAL

## 2024-07-31 DIAGNOSIS — L30.8 OTHER ECZEMA: ICD-10-CM

## 2024-07-31 DIAGNOSIS — L29.9 PRURITUS: Primary | ICD-10-CM

## 2024-07-31 PROCEDURE — 99214 OFFICE O/P EST MOD 30 MIN: CPT | Performed by: DERMATOLOGY

## 2024-07-31 PROCEDURE — 1036F TOBACCO NON-USER: CPT | Performed by: DERMATOLOGY

## 2024-07-31 RX ORDER — TRIAMCINOLONE ACETONIDE 1 MG/G
OINTMENT TOPICAL
Qty: 30 G | Refills: 0 | Status: SHIPPED | OUTPATIENT
Start: 2024-07-31

## 2024-07-31 NOTE — PROGRESS NOTES
Subjective     Caroline Bauer is a 33 y.o. female who presents for the following: Rash (Pt presents for examination of rash to neck and collarbones. She states she noticed the rash this morning. ) and Itching (Pt states she has itching to her lower legs, hands, and face. Denies rash to the area. ).     Review of Systems:  No other skin or systemic complaints other than what is documented elsewhere in the note.    The following portions of the chart were reviewed this encounter and updated as appropriate:   Tobacco  Allergies  Meds  Problems  Med Hx  Surg Hx  Fam Hx         Skin Cancer History  No skin cancer on file.      Specialty Problems          Dermatology Problems    Acne vulgaris    Seborrheic dermatitis, unspecified        Objective   Well appearing patient in no apparent distress; mood and affect are within normal limits.    A focused skin examination was performed. All findings within normal limits unless otherwise noted below.    Assessment/Plan   1. Pruritus (5)  Head - Anterior (Face), Left Hand - Posterior, Left Lower Leg - Anterior, Right Hand - Posterior, Right Lower Leg - Anterior  no primary lesions noted on exam, xerosis    Patient notes itching on the hands, face and lower legs for a few weeks  -No primary lesions on exam  -No excoriations  -Dry skin noted  -Start emollients with CeraVe Itch Relief lotion once daily and Aveeno Eczema Therapy cream once daily  -Patient has had extensive laboratory work performed in April 2024, no issues on laboratory review concerning for systemic cause of itching (normal TSH, FT4, T3, CMP, CBC, vitamin D, etc)  -Recommend to begin measures to reduce pruritus, including emollients with pramoxine (such as CeraVe Itch Relief lotion twice daily every day), ice packs to use to interrupt itch attacks, and may also obtain over the counter Sarna lotion (keep in the fridge, apply cold whenever itching arises).     2. Other eczema  Neck -  Anterior  Erythematous, eczematous patch on the left anterior neck    -Discussed nature of condition, consistent with eczematous dermatitis  -Reviewed treatment options  -Start emollients as described above  -Recommend to start topical steroid triamcinolone ointment to this focal area of eczema twice daily for 2 weeks, then discontinue.    -Discussed with/information given to the patient on the risks, benefits and alternatives of the usage of topical corticosteroids, including but not limited to: atrophy (thinning of the skin), striae (stretch marks), telangiectasia (blood vessel growth), and dyspigmentation (discoloration of the skin).  -Recommend to limit long-term use of topical corticosteroids to less than 14 days per month to reduce risk of side effects.      Related Medications  triamcinolone (Kenalog) 0.1 % ointment  Apply to affected areas twice daily when active as needed. Use less than 14 days per month.        Follow up as needed  Discussed if there are any changes or development of concerning symptoms (lesion/skin condition is changing, bleeding, enlarging, or worsening) the patient is to contact my office. The patient verbalizes understanding.    Estrella Lizama MD  7/31/2024

## 2024-08-22 ENCOUNTER — LAB (OUTPATIENT)
Dept: LAB | Facility: LAB | Age: 34
End: 2024-08-22
Payer: COMMERCIAL

## 2024-08-22 ENCOUNTER — OFFICE VISIT (OUTPATIENT)
Dept: PRIMARY CARE | Facility: CLINIC | Age: 34
End: 2024-08-22
Payer: COMMERCIAL

## 2024-08-22 VITALS — TEMPERATURE: 98 F | SYSTOLIC BLOOD PRESSURE: 106 MMHG | DIASTOLIC BLOOD PRESSURE: 75 MMHG | HEART RATE: 77 BPM

## 2024-08-22 DIAGNOSIS — E89.0 POSTOPERATIVE HYPOTHYROIDISM: ICD-10-CM

## 2024-08-22 DIAGNOSIS — Z11.59 SCREENING FOR VIRAL DISEASE: Primary | ICD-10-CM

## 2024-08-22 DIAGNOSIS — Z11.59 SCREENING FOR VIRAL DISEASE: ICD-10-CM

## 2024-08-22 PROCEDURE — 1036F TOBACCO NON-USER: CPT | Performed by: FAMILY MEDICINE

## 2024-08-22 PROCEDURE — 36415 COLL VENOUS BLD VENIPUNCTURE: CPT

## 2024-08-22 PROCEDURE — 99213 OFFICE O/P EST LOW 20 MIN: CPT | Performed by: FAMILY MEDICINE

## 2024-08-22 PROCEDURE — 86765 RUBEOLA ANTIBODY: CPT

## 2024-08-22 PROCEDURE — 86317 IMMUNOASSAY INFECTIOUS AGENT: CPT

## 2024-08-22 PROCEDURE — 86735 MUMPS ANTIBODY: CPT

## 2024-08-22 NOTE — PROGRESS NOTES
"Subjective   Patient ID: Caroline Bauer is a 33 y.o. female who presents for Follow-up.  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     A review of system was completed.  All systems were reviewed and were normal, except for the ones that are listed in the HPI.    Objective   Physical Exam  Constitutional:       Appearance: Normal appearance.   HENT:      Head: Normocephalic and atraumatic.      Right Ear: Tympanic membrane, ear canal and external ear normal.      Left Ear: Tympanic membrane, ear canal and external ear normal.      Nose: Nose normal.      Mouth/Throat:      Mouth: Mucous membranes are moist.      Pharynx: Oropharynx is clear.   Eyes:      Extraocular Movements: Extraocular movements intact.      Conjunctiva/sclera: Conjunctivae normal.      Pupils: Pupils are equal, round, and reactive to light.   Cardiovascular:      Rate and Rhythm: Normal rate and regular rhythm.      Pulses: Normal pulses.   Pulmonary:      Effort: Pulmonary effort is normal.      Breath sounds: Normal breath sounds.   Abdominal:      General: Abdomen is flat. Bowel sounds are normal.      Palpations: Abdomen is soft.   Musculoskeletal:         General: Normal range of motion.      Cervical back: Normal range of motion and neck supple.   Skin:     General: Skin is warm.   Neurological:      General: No focal deficit present.      Mental Status: She is alert and oriented to person, place, and time. Mental status is at baseline.   Psychiatric:         Mood and Affect: Mood normal.         Behavior: Behavior normal.         Thought Content: Thought content normal.         Judgment: Judgment normal.     Assessment/Plan   Problem List Items Addressed This Visit       Hypothyroidism    Screening for viral disease - Primary     -MMR ordered.          Relevant Orders "    Rubella Antibody, IgG    Rubeola Antibody, IgG    Mumps Antibody, IgG    Patient to return to office as needed.

## 2024-08-23 LAB
MEV IGG SER QL IA: POSITIVE
MUMPS IGG ANTIBODY INDEX: 1 IA
MUV IGG SER IA-ACNC: ABNORMAL
RUBEOLA IGG ANTIBODY INDEX: 4.6 IA
RUBV IGG SERPL IA-ACNC: 6.2 IA
RUBV IGG SERPL QL IA: POSITIVE

## 2024-08-25 NOTE — RESULT ENCOUNTER NOTE
Patient's MMR vaccine shows that her titers for the months are equivocal or borderline.  We recommend the MMR booster vaccine.  The patient should come for a nurse only in order to get the MMR booster.  Please notify her.

## 2024-08-26 ENCOUNTER — TELEPHONE (OUTPATIENT)
Dept: PRIMARY CARE | Facility: CLINIC | Age: 34
End: 2024-08-26
Payer: COMMERCIAL

## 2024-08-28 ENCOUNTER — APPOINTMENT (OUTPATIENT)
Dept: PRIMARY CARE | Facility: CLINIC | Age: 34
End: 2024-08-28
Payer: COMMERCIAL

## 2024-08-30 ENCOUNTER — CLINICAL SUPPORT (OUTPATIENT)
Dept: PRIMARY CARE | Facility: CLINIC | Age: 34
End: 2024-08-30
Payer: COMMERCIAL

## 2024-08-30 PROCEDURE — 90707 MMR VACCINE SC: CPT | Performed by: FAMILY MEDICINE

## 2024-08-30 PROCEDURE — 90471 IMMUNIZATION ADMIN: CPT | Performed by: FAMILY MEDICINE

## 2024-09-05 ENCOUNTER — HOSPITAL ENCOUNTER (OUTPATIENT)
Dept: RADIOLOGY | Facility: CLINIC | Age: 34
Discharge: HOME | End: 2024-09-05
Payer: COMMERCIAL

## 2024-09-05 ENCOUNTER — APPOINTMENT (OUTPATIENT)
Dept: PRIMARY CARE | Facility: CLINIC | Age: 34
End: 2024-09-05
Payer: COMMERCIAL

## 2024-09-05 DIAGNOSIS — R91.8 LUNG NODULES: ICD-10-CM

## 2024-09-05 PROCEDURE — 71250 CT THORAX DX C-: CPT

## 2024-09-30 ENCOUNTER — APPOINTMENT (OUTPATIENT)
Dept: PRIMARY CARE | Facility: CLINIC | Age: 34
End: 2024-09-30
Payer: COMMERCIAL

## 2024-09-30 VITALS — TEMPERATURE: 96.8 F

## 2024-09-30 DIAGNOSIS — Z23 IMMUNIZATION DUE: ICD-10-CM

## 2024-09-30 DIAGNOSIS — C73 PAPILLARY THYROID CARCINOMA (MULTI): ICD-10-CM

## 2024-09-30 DIAGNOSIS — Z11.59 SCREENING FOR VIRAL DISEASE: ICD-10-CM

## 2024-09-30 DIAGNOSIS — M54.2 SORE NECK: Primary | ICD-10-CM

## 2024-09-30 DIAGNOSIS — Z00.00 ANNUAL PHYSICAL EXAM: ICD-10-CM

## 2024-09-30 PROCEDURE — 99214 OFFICE O/P EST MOD 30 MIN: CPT | Performed by: FAMILY MEDICINE

## 2024-09-30 NOTE — PROGRESS NOTES
"Subjective   Patient ID: Caroline Bauer is a 33 y.o. female who presents for Labs Only.  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 No dysphagia. It started few days ago.   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  Her last MMR booster was done on 8/30/2024.    A review of system was completed.  All systems were reviewed and were normal, except for the ones that are listed in the HPI.    Objective   Physical Exam  Constitutional:       Appearance: Normal appearance.   HENT:      Head: Normocephalic and atraumatic.      Right Ear: Tympanic membrane, ear canal and external ear normal.      Left Ear: Tympanic membrane, ear canal and external ear normal.      Nose: Nose normal.      Mouth/Throat:      Mouth: Mucous membranes are moist.      Pharynx: Oropharynx is clear.   Eyes:      Extraocular Movements: Extraocular movements intact.      Conjunctiva/sclera: Conjunctivae normal.      Pupils: Pupils are equal, round, and reactive to light.   Cardiovascular:      Rate and Rhythm: Normal rate and regular rhythm.      Pulses: Normal pulses.   Pulmonary:      Effort: Pulmonary effort is normal.      Breath sounds: Normal breath sounds.   Abdominal:      General: Abdomen is flat. Bowel sounds are normal.      Palpations: Abdomen is soft.   Musculoskeletal:         General: Normal range of motion.      Cervical back: Normal range of motion and neck supple.   Skin:     General: Skin is warm.   Neurological:      General: No focal deficit present.      Mental Status: She is alert and oriented to person, place, and time. Mental status is at baseline.   Psychiatric:         Mood and Affect: Mood normal.         Behavior: Behavior normal.    "      Thought Content: Thought content normal.         Judgment: Judgment normal.     Assessment/Plan   Problem List Items Addressed This Visit       Papillary thyroid carcinoma (Multi)    Relevant Orders    TSH    T4, free    T3, free    US thyroid    Screening for viral disease    Relevant Orders    Mumps Antibody, IgG    Rubella Antibody, IgG    Rubeola Antibody, IgG    Immunization due     -MMR titer done today to be done 90 days away from the last MMR dose.          Sore neck - Primary     -In the context of a history of thyroid cancer.    -Blood test ordered.          Relevant Orders    TSH    T4, free    T3, free    US thyroid     Other Visit Diagnoses       Annual physical exam        Relevant Orders    CBC         Patient to return to office as scheduled or within a month if not better

## 2024-10-10 ENCOUNTER — HOSPITAL ENCOUNTER (OUTPATIENT)
Dept: RADIOLOGY | Facility: CLINIC | Age: 34
End: 2024-10-10
Payer: COMMERCIAL

## 2024-10-22 ENCOUNTER — APPOINTMENT (OUTPATIENT)
Dept: PRIMARY CARE | Facility: CLINIC | Age: 34
End: 2024-10-22
Payer: COMMERCIAL

## 2024-10-23 ENCOUNTER — CLINICAL SUPPORT (OUTPATIENT)
Dept: PRIMARY CARE | Facility: CLINIC | Age: 34
End: 2024-10-23
Payer: COMMERCIAL

## 2024-10-23 DIAGNOSIS — Z23 ENCOUNTER FOR IMMUNIZATION: Primary | ICD-10-CM

## 2024-10-23 PROCEDURE — 90471 IMMUNIZATION ADMIN: CPT | Performed by: FAMILY MEDICINE

## 2024-10-23 PROCEDURE — 90707 MMR VACCINE SC: CPT | Performed by: FAMILY MEDICINE

## 2024-10-29 ENCOUNTER — OFFICE VISIT (OUTPATIENT)
Dept: PULMONOLOGY | Facility: HOSPITAL | Age: 34
End: 2024-10-29
Payer: COMMERCIAL

## 2024-10-29 VITALS
DIASTOLIC BLOOD PRESSURE: 74 MMHG | TEMPERATURE: 97.6 F | OXYGEN SATURATION: 98 % | BODY MASS INDEX: 35.36 KG/M2 | SYSTOLIC BLOOD PRESSURE: 105 MMHG | HEART RATE: 75 BPM | WEIGHT: 206 LBS

## 2024-10-29 DIAGNOSIS — J45.20 MILD INTERMITTENT ASTHMA WITHOUT COMPLICATION (HHS-HCC): ICD-10-CM

## 2024-10-29 DIAGNOSIS — R91.8 LUNG NODULES: Primary | ICD-10-CM

## 2024-10-29 PROCEDURE — 99213 OFFICE O/P EST LOW 20 MIN: CPT | Performed by: INTERNAL MEDICINE

## 2024-10-29 PROCEDURE — 99213 OFFICE O/P EST LOW 20 MIN: CPT | Mod: 25 | Performed by: INTERNAL MEDICINE

## 2024-10-29 PROCEDURE — 90656 IIV3 VACC NO PRSV 0.5 ML IM: CPT | Performed by: INTERNAL MEDICINE

## 2024-10-29 ASSESSMENT — LIFESTYLE VARIABLES
HOW OFTEN DO YOU HAVE SIX OR MORE DRINKS ON ONE OCCASION: NEVER
HOW MANY STANDARD DRINKS CONTAINING ALCOHOL DO YOU HAVE ON A TYPICAL DAY: PATIENT DOES NOT DRINK
AUDIT-C TOTAL SCORE: 0
HOW OFTEN DO YOU HAVE A DRINK CONTAINING ALCOHOL: NEVER
SKIP TO QUESTIONS 9-10: 1

## 2024-10-29 ASSESSMENT — PAIN SCALES - GENERAL: PAINLEVEL_OUTOF10: 0-NO PAIN

## 2024-12-04 ENCOUNTER — OFFICE VISIT (OUTPATIENT)
Dept: OTOLARYNGOLOGY | Facility: HOSPITAL | Age: 34
End: 2024-12-04
Payer: COMMERCIAL

## 2024-12-04 DIAGNOSIS — C73 PAPILLARY THYROID CARCINOMA (MULTI): ICD-10-CM

## 2024-12-04 DIAGNOSIS — E03.9 ACQUIRED HYPOTHYROIDISM: Primary | ICD-10-CM

## 2024-12-04 PROCEDURE — 1036F TOBACCO NON-USER: CPT | Performed by: OTOLARYNGOLOGY

## 2024-12-04 PROCEDURE — 99213 OFFICE O/P EST LOW 20 MIN: CPT | Performed by: OTOLARYNGOLOGY

## 2024-12-04 ASSESSMENT — ENCOUNTER SYMPTOMS
APPETITE CHANGE: 0
STRIDOR: 0
COUGH: 0
SORE THROAT: 0
ADENOPATHY: 0
NAUSEA: 0
FATIGUE: 0
FEVER: 0
TROUBLE SWALLOWING: 0
NECK PAIN: 0
VOICE CHANGE: 0
CHILLS: 0
FACIAL SWELLING: 0
SHORTNESS OF BREATH: 0
VOMITING: 0
UNEXPECTED WEIGHT CHANGE: 0

## 2024-12-04 ASSESSMENT — PAIN SCALES - GENERAL: PAINLEVEL_OUTOF10: 0-NO PAIN

## 2024-12-05 NOTE — PROGRESS NOTES
Subjective   Patient ID: Caroline Bauer is a 34 y.o. female who presents for Follow-up of her thyroid.  Last seen 6/24.  She continues to follow with endocrinology Dr. Naranjo at [facility].  No other ENT concerns today.  TSH remains normal 0.5, TG <0.1.  Overall she's doing well.  No recent URI symptoms.    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 TSH 1.   10/24: TG <0.1, TSH 0.5    HPI    Review of Systems   Constitutional:  Negative for appetite change, chills, fatigue, fever and unexpected weight change.   HENT:  Negative for dental problem, drooling, ear pain, facial swelling, hearing loss, mouth sores, sore throat, tinnitus, trouble swallowing and voice change.    Respiratory:  Negative for cough, shortness of breath and stridor.    Gastrointestinal:  Negative for nausea and vomiting.   Musculoskeletal:  Negative for neck pain.   Hematological:  Negative for adenopathy.   All other systems reviewed and are negative.      Objective   Physical Exam  HENT:      Head: Normocephalic and atraumatic.      Right Ear: Tympanic membrane and ear canal normal.      Left Ear: Tympanic membrane and ear canal normal.      Nose: Nose normal.      Mouth/Throat:      Mouth: Mucous membranes are moist.      Pharynx: Oropharynx is clear.      Comments: No mouth sores  Eyes:      Conjunctiva/sclera: Conjunctivae normal.   Neck:      Comments: S/p thyroidectomy scar well healed.  No recurrent tissue.  No LAD  Neurological:      Mental Status: She is alert and oriented to person, place, and time.   Psychiatric:         Attention and Perception: Attention normal.         Mood and Affect: Mood normal.         Speech: Speech normal.         Behavior: Behavior normal. Behavior is cooperative.         Assessment/Plan   Problem List Items Addressed This Visit             ICD-10-CM     Hypothyroidism - Primary E03.9     Well controlled  Follows with endocrinology, Dr. Naranjo at [facility]   Continue daily synthroid         Papillary thyroid carcinoma (Multi) C73     No evidence of disease  TG is undetectable  TSH is at goal  Follow up in 6 months             Slime Paul MD    Head & Neck Surgical Oncology & Reconstruction  Department of Otolaryngology - Head and Neck Surgery

## 2024-12-05 NOTE — ASSESSMENT & PLAN NOTE
Well controlled  Follows with endocrinology, Dr. Naranjo at [facility]   Continue daily synthroid

## 2025-01-03 ENCOUNTER — APPOINTMENT (OUTPATIENT)
Dept: PRIMARY CARE | Facility: CLINIC | Age: 35
End: 2025-01-03
Payer: COMMERCIAL

## 2025-01-06 ENCOUNTER — APPOINTMENT (OUTPATIENT)
Dept: PRIMARY CARE | Facility: CLINIC | Age: 35
End: 2025-01-06
Payer: COMMERCIAL

## 2025-01-16 ENCOUNTER — TELEPHONE (OUTPATIENT)
Dept: PRIMARY CARE | Facility: CLINIC | Age: 35
End: 2025-01-16

## 2025-01-16 ENCOUNTER — APPOINTMENT (OUTPATIENT)
Dept: PRIMARY CARE | Facility: CLINIC | Age: 35
End: 2025-01-16
Payer: COMMERCIAL

## 2025-02-21 ASSESSMENT — ENCOUNTER SYMPTOMS
SHORTNESS OF BREATH: 0
NAUSEA: 0
FATIGUE: 0
UNEXPECTED WEIGHT CHANGE: 0
TROUBLE SWALLOWING: 0
NECK PAIN: 0
VOICE CHANGE: 0
APPETITE CHANGE: 0
FEVER: 0
CHILLS: 0
STRIDOR: 0
SORE THROAT: 0
VOMITING: 0
COUGH: 0
FACIAL SWELLING: 0
ADENOPATHY: 0

## 2025-02-21 NOTE — PROGRESS NOTES
Subjective   Patient ID: Caroline Bauer is a 34 y.o. female who presents for follow up of her neck.  She is having pain in her right neck (around her thyroid incision) as well as along the SCM and deeper in the neck by her throat.  She has had chronic pain off and on for the last several years.  However the last 2 weeks the pain was a 6-7/10.  She had difficulty with swallowing due to the feeling of tightness in her throat.  Last seen 12/24.  She continues to follow with endocrinology Dr. Naranjo at [facility].    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 TSH 1.   10/24: TG <0.1, TSH 0.5    HPI    Review of Systems   Constitutional:  Negative for appetite change, chills, fatigue, fever and unexpected weight change.   HENT:  Negative for dental problem, drooling, ear pain, facial swelling, hearing loss, mouth sores, sore throat, tinnitus, trouble swallowing and voice change.    Respiratory:  Negative for cough, shortness of breath and stridor.    Gastrointestinal:  Negative for nausea and vomiting.   Musculoskeletal:  Negative for neck pain.   Hematological:  Negative for adenopathy.   All other systems reviewed and are negative.    Objective   Physical Exam  HENT:      Head: Normocephalic and atraumatic.      Right Ear: Tympanic membrane and ear canal normal.      Left Ear: Tympanic membrane and ear canal normal.      Nose: Nose normal.      Mouth/Throat:      Mouth: Mucous membranes are moist.      Pharynx: Oropharynx is clear.      Comments: No mouth sores  Eyes:      Conjunctiva/sclera: Conjunctivae normal.   Neck:      Comments: S/p thyroidectomy scar well healed.  No recurrent tissue.  She has tenderness along her SCM.  No pathologic LAD  Neurological:      Mental Status: She is alert and oriented to person, place, and time.   Psychiatric:         Attention and  Perception: Attention normal.         Mood and Affect: Mood normal.         Speech: Speech normal.         Behavior: Behavior normal. Behavior is cooperative.       Procedure:  PROCEDURE NOTE:  Recommended flexible nasopharyngoscopy & laryngoscopy.  Risks, benefits, personnel and alternatives were explained.  The patient wished to proceed.  S/he was re-identified.  PROCEDURE:  Flexible nasopharyngoscopy and laryngoscopy  PREOPERATIVE DIAGNOSIS: Throat discomfort/odynophagia  POSTOPERATIVE DIAGNOSIS: Same as above, normal oropharynx, hypopharynx and larynx  INDICATIONS: Inability to tolerate mirror exam  ANESTHESIA: 4% lidocaine and 0.5% phenylephrine  PROCEDURE:  With the patient sitting upright topical anesthesia and vasoconstriction was applied with spray to the right side(s) of the nose.  After waiting an appropriate period of time for anesthesia/vasoconstriction to become effective, a flexible laryngoscope was passed through the right side(s) of the nose.  Nasopharynx, oropharynx, hypopharynx and larynx were examined.  FINDINGS:  The nasopharynx and oropharynx were normal without any lesions or masses visualized.  No masses or lesions were visualized at the base of tongue, vallecula, epiglottis, aryepiglottic folds, pyriform sinuses, and lateral pharyngeal walls.  True vocal fold movement was normal.  The patient's airway was widely patent with no evidence of obstruction.  Patient tolerated the procedure well, and there were no complications.     Assessment/Plan   Problem List Items Addressed This Visit             ICD-10-CM    Papillary thyroid carcinoma (Multi) C73     No evidence of disease  TG is undetectable  TSH is at goal  She is following up with endocrinology and overall doing well         Relevant Orders    US neck    Cervical lymphadenopathy R59.0     She is having right neck tenderness and right throat discomfort worse over the last 2 weeks as well as throat discomfort  Endoscopy today was  negative  Reassurance provided  Recommend neck US to further evaluate neck lymph nodes  Follow up after imaging         Relevant Orders    US neck      Slime Paul MD    Head & Neck Surgical Oncology & Reconstruction  Department of Otolaryngology - Head and Neck Surgery

## 2025-02-26 ENCOUNTER — TELEMEDICINE (OUTPATIENT)
Dept: PRIMARY CARE | Facility: CLINIC | Age: 35
End: 2025-02-26
Payer: COMMERCIAL

## 2025-02-26 DIAGNOSIS — J06.9 ACUTE URI: Primary | ICD-10-CM

## 2025-02-26 PROCEDURE — 99213 OFFICE O/P EST LOW 20 MIN: CPT | Performed by: FAMILY MEDICINE

## 2025-02-26 NOTE — ASSESSMENT & PLAN NOTE
-Since the patient started to improve today, we recommend to continue to observe her symptoms.  No treatment warranted at this time.

## 2025-02-26 NOTE — PROGRESS NOTES
"Subjective   Patient ID: Caroline Bauer is a 34 y.o. female.    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  It started few days ago.     Review of Systems    Objective   Physical Exam    Assessment/Plan   Diagnoses and all orders for this visit:  Acute URI  -Since the patient started to improve today, we recommend to continue to observe her symptoms.  No treatment warranted at this time.   -Follow-up within the week if worsening.      "

## 2025-03-03 ENCOUNTER — APPOINTMENT (OUTPATIENT)
Dept: OTOLARYNGOLOGY | Facility: CLINIC | Age: 35
End: 2025-03-03
Payer: COMMERCIAL

## 2025-03-03 VITALS — BODY MASS INDEX: 31.24 KG/M2 | WEIGHT: 182 LBS | TEMPERATURE: 98.6 F

## 2025-03-03 DIAGNOSIS — R59.0 CERVICAL LYMPHADENOPATHY: ICD-10-CM

## 2025-03-03 DIAGNOSIS — C73 PAPILLARY THYROID CARCINOMA (MULTI): ICD-10-CM

## 2025-03-03 PROCEDURE — 92511 NASOPHARYNGOSCOPY: CPT | Performed by: OTOLARYNGOLOGY

## 2025-03-03 PROCEDURE — 99214 OFFICE O/P EST MOD 30 MIN: CPT | Performed by: OTOLARYNGOLOGY

## 2025-03-03 NOTE — PATIENT INSTRUCTIONS
Dr. Paul evaluated you today.    Your care plan is outlined below:  -- -- Dr. Paul recommends a neck US. This can be scheduled on your way out today, by calling [phone], or by accessing the order on your my chart. Please schedule this test at your earliest convenience.     General appointment line please call [phone]  For general questions or scheduling issues please call [phone] option #2   For medical questions or surgery scheduling please call [phone] on Mondays, Wednesdays and Thursdays or [phone] on Tuesdays and Fridays. Please be sure to leave a voice mail or your call will not be able to be returned.     Dr. Paul makes every effort to run on time for your appointments.  Therefore, if you are more than 30 minutes late for your appointment, unrelated to a scan or another appointment such as chemotherapy or radiation, your appointment will need to be rescheduled to another day.  We appreciate your understanding.

## 2025-03-04 NOTE — ASSESSMENT & PLAN NOTE
She is having right neck tenderness and right throat discomfort worse over the last 2 weeks as well as throat discomfort  Endoscopy today was negative  Reassurance provided  Recommend neck US to further evaluate neck lymph nodes  Follow up after imaging

## 2025-03-04 NOTE — ASSESSMENT & PLAN NOTE
No evidence of disease  TG is undetectable  TSH is at goal  She is following up with endocrinology and overall doing well

## 2025-03-05 ENCOUNTER — OFFICE VISIT (OUTPATIENT)
Dept: PRIMARY CARE | Facility: CLINIC | Age: 35
End: 2025-03-05
Payer: COMMERCIAL

## 2025-03-05 VITALS
DIASTOLIC BLOOD PRESSURE: 84 MMHG | TEMPERATURE: 97.1 F | SYSTOLIC BLOOD PRESSURE: 116 MMHG | BODY MASS INDEX: 31.58 KG/M2 | WEIGHT: 184 LBS | HEART RATE: 86 BPM

## 2025-03-05 DIAGNOSIS — N76.0 ACUTE VAGINITIS: Primary | ICD-10-CM

## 2025-03-05 LAB
POC APPEARANCE, URINE: CLEAR
POC BILIRUBIN, URINE: NEGATIVE
POC BLOOD, URINE: NEGATIVE
POC COLOR, URINE: YELLOW
POC GLUCOSE, URINE: NEGATIVE MG/DL
POC KETONES, URINE: NEGATIVE MG/DL
POC LEUKOCYTES, URINE: NEGATIVE
POC NITRITE,URINE: NEGATIVE
POC PH, URINE: 6 PH
POC PROTEIN, URINE: ABNORMAL MG/DL
POC SPECIFIC GRAVITY, URINE: 1.02
POC UROBILINOGEN, URINE: 0.2 EU/DL

## 2025-03-05 PROCEDURE — 81003 URINALYSIS AUTO W/O SCOPE: CPT | Performed by: FAMILY MEDICINE

## 2025-03-05 PROCEDURE — 99213 OFFICE O/P EST LOW 20 MIN: CPT | Performed by: FAMILY MEDICINE

## 2025-03-05 ASSESSMENT — PAIN SCALES - GENERAL: PAINLEVEL_OUTOF10: 0-NO PAIN

## 2025-03-05 NOTE — PROGRESS NOTES
"Subjective   Patient ID: Caroline Bauer is a 34 y.o. female who presents for Sexual Problem (STI).  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     A review of system was completed.  All systems were reviewed and were normal, except for the ones that are listed in the HPI.    Objective   Physical Exam  Constitutional:       Appearance: Normal appearance.   HENT:      Head: Normocephalic and atraumatic.      Right Ear: Tympanic membrane, ear canal and external ear normal.      Left Ear: Tympanic membrane, ear canal and external ear normal.      Nose: Nose normal.      Mouth/Throat:      Mouth: Mucous membranes are moist.      Pharynx: Oropharynx is clear.   Eyes:      Extraocular Movements: Extraocular movements intact.      Conjunctiva/sclera: Conjunctivae normal.      Pupils: Pupils are equal, round, and reactive to light.   Cardiovascular:      Rate and Rhythm: Normal rate and regular rhythm.      Pulses: Normal pulses.   Pulmonary:      Effort: Pulmonary effort is normal.      Breath sounds: Normal breath sounds.   Abdominal:      General: Abdomen is flat. Bowel sounds are normal.      Palpations: Abdomen is soft.   Musculoskeletal:         General: Normal range of motion.      Cervical back: Normal range of motion and neck supple.   Skin:     General: Skin is warm.   Neurological:      General: No focal deficit present.      Mental Status: She is alert and oriented to person, place, and time. Mental status is at baseline.   Psychiatric:         Mood and Affect: Mood normal.         Behavior: Behavior normal.         Thought Content: Thought content normal.         Judgment: Judgment normal.     Assessment/Plan "   Problem List Items Addressed This Visit       Acute vaginitis - Primary     -UA w/ C+S ordered.  -BV and yeast screen.          Relevant Orders    POCT UA Automated manually resulted    Urine Culture    Vaginitis Gram Stain For Bacterial Vaginosis + Yeast    Patient to return to office if not better within a week.

## 2025-03-06 LAB — BV SCORE VAG QL: NORMAL

## 2025-03-07 ENCOUNTER — HOSPITAL ENCOUNTER (OUTPATIENT)
Dept: RADIOLOGY | Facility: CLINIC | Age: 35
Discharge: HOME | End: 2025-03-07
Payer: COMMERCIAL

## 2025-03-07 ENCOUNTER — TELEPHONE (OUTPATIENT)
Dept: PRIMARY CARE | Facility: CLINIC | Age: 35
End: 2025-03-07

## 2025-03-07 DIAGNOSIS — B37.31 CANDIDA VAGINITIS: Primary | ICD-10-CM

## 2025-03-07 DIAGNOSIS — C73 PAPILLARY THYROID CARCINOMA (MULTI): ICD-10-CM

## 2025-03-07 DIAGNOSIS — R59.0 CERVICAL LYMPHADENOPATHY: ICD-10-CM

## 2025-03-07 PROCEDURE — 76536 US EXAM OF HEAD AND NECK: CPT

## 2025-03-08 PROBLEM — B37.31 CANDIDA VAGINITIS: Status: ACTIVE | Noted: 2025-03-08

## 2025-03-08 LAB — BACTERIA UR CULT: NORMAL

## 2025-03-08 RX ORDER — FLUCONAZOLE 150 MG/1
150 TABLET ORAL ONCE
Qty: 2 TABLET | Refills: 0 | Status: SHIPPED | OUTPATIENT
Start: 2025-03-08 | End: 2025-03-08

## 2025-03-28 ENCOUNTER — PATIENT MESSAGE (OUTPATIENT)
Dept: PRIMARY CARE | Facility: CLINIC | Age: 35
End: 2025-03-28
Payer: COMMERCIAL

## 2025-03-28 DIAGNOSIS — B37.31 CANDIDA VAGINITIS: Primary | ICD-10-CM

## 2025-04-14 RX ORDER — FLUCONAZOLE 150 MG/1
150 TABLET ORAL ONCE
Qty: 2 TABLET | Refills: 0 | Status: SHIPPED | OUTPATIENT
Start: 2025-04-14 | End: 2025-04-14

## 2025-05-01 ENCOUNTER — APPOINTMENT (OUTPATIENT)
Dept: PRIMARY CARE | Facility: CLINIC | Age: 35
End: 2025-05-01
Payer: COMMERCIAL

## 2025-05-14 ENCOUNTER — APPOINTMENT (OUTPATIENT)
Dept: PRIMARY CARE | Facility: CLINIC | Age: 35
End: 2025-05-14
Payer: COMMERCIAL

## 2025-05-28 ENCOUNTER — APPOINTMENT (OUTPATIENT)
Dept: PRIMARY CARE | Facility: CLINIC | Age: 35
End: 2025-05-28
Payer: COMMERCIAL

## 2025-05-28 VITALS — SYSTOLIC BLOOD PRESSURE: 124 MMHG | HEART RATE: 100 BPM | DIASTOLIC BLOOD PRESSURE: 92 MMHG | TEMPERATURE: 98 F

## 2025-05-28 DIAGNOSIS — B37.0 ORAL THRUSH: Primary | ICD-10-CM

## 2025-05-28 DIAGNOSIS — M25.50 ARTHRALGIA OF MULTIPLE JOINTS: ICD-10-CM

## 2025-05-28 DIAGNOSIS — M25.50 ARTHRALGIA, UNSPECIFIED JOINT: ICD-10-CM

## 2025-05-28 DIAGNOSIS — Z13.31 DEPRESSION SCREENING NEGATIVE: ICD-10-CM

## 2025-05-28 PROCEDURE — 99214 OFFICE O/P EST MOD 30 MIN: CPT | Performed by: FAMILY MEDICINE

## 2025-05-28 PROCEDURE — 1036F TOBACCO NON-USER: CPT | Performed by: FAMILY MEDICINE

## 2025-05-28 RX ORDER — IBUPROFEN 800 MG/1
800 TABLET, FILM COATED ORAL 3 TIMES DAILY PRN
Qty: 60 TABLET | Refills: 5 | Status: SHIPPED | OUTPATIENT
Start: 2025-05-28 | End: 2026-05-28

## 2025-05-28 ASSESSMENT — PATIENT HEALTH QUESTIONNAIRE - PHQ9
SUM OF ALL RESPONSES TO PHQ9 QUESTIONS 1 & 2: 0
1. LITTLE INTEREST OR PLEASURE IN DOING THINGS: NOT AT ALL
2. FEELING DOWN, DEPRESSED OR HOPELESS: NOT AT ALL

## 2025-05-28 NOTE — PROGRESS NOTES
"Subjective   Patient ID: Caroline Bauer is a 34 y.o. female who presents for Follow-up.  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  She is having recurring arthralgia of multiple joints and it has been exacerbated lately.    2- Mouth thrush.  She thinks that it has been related to Zepbound.  She stopped the medication about two and half weeks ago.     A review of system was completed.  All systems were reviewed and were normal, except for the ones that are listed in the HPI.    Objective   Physical Exam  Constitutional:       Appearance: Normal appearance.   HENT:      Head: Normocephalic and atraumatic.      Right Ear: Tympanic membrane, ear canal and external ear normal.      Left Ear: Tympanic membrane, ear canal and external ear normal.      Nose: Nose normal.      Mouth/Throat:      Mouth: Mucous membranes are moist.      Pharynx: Oropharynx is clear.   Eyes:      Extraocular Movements: Extraocular movements intact.      Conjunctiva/sclera: Conjunctivae normal.      Pupils: Pupils are equal, round, and reactive to light.   Cardiovascular:      Rate and Rhythm: Normal rate and regular rhythm.      Pulses: Normal pulses.   Pulmonary:      Effort: Pulmonary effort is normal.      Breath sounds: Normal breath sounds.   Abdominal:      General: Abdomen is flat. Bowel sounds are normal.      Palpations: Abdomen is soft.   Musculoskeletal:         General: Normal range of motion.      Cervical back: Normal range of motion and neck supple.   Skin:     General: Skin is warm.   Neurological:      General: No focal deficit present.      Mental Status: She is alert and oriented to person, place, and time. Mental status is at baseline.   Psychiatric: "         Mood and Affect: Mood normal.         Behavior: Behavior normal.         Thought Content: Thought content normal.         Judgment: Judgment normal.     Assessment/Plan   Problem List Items Addressed This Visit       Arthralgia    Relevant Medications    ibuprofen 800 mg tablet    Other Relevant Orders    Uric Acid    C-Reactive Protein    Sedimentation Rate    JEFFERY with Reflex to MIKAL    Rheumatoid Factor    Referral to Rheumatology    Oral thrush - Primary    -She thinks that it has been related to Zepbound.  She stopped the medication about two and half weeks ago.   -Nystatin solution started.           Arthralgia of multiple joints    -Arthritis panel ordered.  -Referral to rheumatologist done.   -Ibuprofen 800 mg TID for 2 weeks then PRN.           Relevant Medications    ibuprofen 800 mg tablet    Depression screening negative    Patient to return to office for your CPE.

## 2025-05-28 NOTE — ASSESSMENT & PLAN NOTE
-Arthritis panel ordered.  -Referral to rheumatologist done.   -Ibuprofen 800 mg TID for 2 weeks then PRN.

## 2025-05-28 NOTE — ASSESSMENT & PLAN NOTE
-She thinks that it has been related to Zepbound.  She stopped the medication about two and half weeks ago.   -Nystatin solution started.

## 2025-05-30 ENCOUNTER — TELEPHONE (OUTPATIENT)
Dept: PRIMARY CARE | Facility: CLINIC | Age: 35
End: 2025-05-30
Payer: COMMERCIAL

## 2025-05-30 LAB
ANA SER QL IF: NEGATIVE
CRP SERPL-MCNC: 8 MG/L
ERYTHROCYTE [SEDIMENTATION RATE] IN BLOOD BY WESTERGREN METHOD: 25 MM/H
RHEUMATOID FACT SERPL-ACNC: <10 IU/ML
URATE SERPL-MCNC: 8.2 MG/DL (ref 2.5–7)

## 2025-06-02 ENCOUNTER — HOSPITAL ENCOUNTER (OUTPATIENT)
Dept: RADIOLOGY | Facility: CLINIC | Age: 35
Discharge: HOME | End: 2025-06-02
Payer: COMMERCIAL

## 2025-06-02 ENCOUNTER — APPOINTMENT (OUTPATIENT)
Dept: ORTHOPEDIC SURGERY | Facility: CLINIC | Age: 35
End: 2025-06-02
Payer: COMMERCIAL

## 2025-06-02 DIAGNOSIS — M46.1 SACROILIITIS: Primary | ICD-10-CM

## 2025-06-02 DIAGNOSIS — M54.16 LUMBAR RADICULOPATHY, CHRONIC: ICD-10-CM

## 2025-06-02 PROCEDURE — 99214 OFFICE O/P EST MOD 30 MIN: CPT | Performed by: ORTHOPAEDIC SURGERY

## 2025-06-02 PROCEDURE — 72110 X-RAY EXAM L-2 SPINE 4/>VWS: CPT

## 2025-06-02 PROCEDURE — 72110 X-RAY EXAM L-2 SPINE 4/>VWS: CPT | Performed by: RADIOLOGY

## 2025-06-02 NOTE — PROGRESS NOTES
S: Caroline Bauer is a 34 y.o. year old female patient who is here to follow up on low back pain.  She was last seen here in clinic on January 31, 2022 for right low back pain.  At that time she was diagnosed with right sacroiliitis she was given referral to pain management for a possible SI joint injection.  She did have that injection done and it was successful for a long period of time.  She returns today as she is began experiencing a return of her pain over the past few weeks.  She does report that she has some radiation down to her right glute.  She is currently in physical therapy both for her back as well as her pelvic floor, and she feels that physical therapy has been effective.  She does say she has been doing core and leg strengthening exercises.  She was recently prescribed extra strength ibuprofen by her PCP which is also offered some relief.      O:   General: Patient appears well-nourished and well-developed in no acute distress, Alert and Oriented x3  Psych: Pleasant mood and affect  HEENT: Extraocular muscles intact, pupils equal and round. Sclerae anicteric   Cardio: extremities warm and well perfused  Resp: unlabored symmetric breathing  Skin: no open wounds or rash  Musculoskeletal/Neuro Exam: Normal gait. She has tenderness to palpation over the right SI joint. Pain with Leonard exam on the right negative on the left. Tight hamstrings bilaterally. Negative straight leg raise.  Lower extremity     Motor: Right leg with 5 out of 5 motor strength with hip flexion, knee extension, ankle dorsiflexion plantarflexion and EHL against resistance.  Left leg with 5 out of 5 motor strength with hip flexion, knee extension, ankle dorsiflexion plantarflexion EHL against resistance  Sensation to light touch intact along L2 to S1 distribution bilaterally  2+ patella Reflex bilaterally          Imaging:  I reviewed x-rays obtained in clinic today.  AP lateral flexion-extension views.  X-rays of the lumbar  spine demonstrate normal coronal and sagittal alignment.  There is evidence of some disc space narrowing at L5-S1.  There is no spondylolisthesis evident with flexion-extension views.  Overall minimal degenerative changes.          A/P: Caroline Bauer is a 34 y.o. year old female patient with right sided back pain with tenderness over the right SI joint on exam.  She has positive Leonard on the right on exam I think this is a flareup of her sacroiliitis which she had responded well to injections previously.  She is neurologically intact with reassuring imaging in clinic today.  Discussed etiology of the patient's pain and that it is consistent with recurrent right sacroiliitis.  Given the previous injection was successful for her, I recommended that she receive another injection.  She was given a referral to pain management for this.  She can continue with anti-inflammatory therapy.  She wishes to continue with her current physical therapist for her strengthening and pelvic floor exercises.  Discussed the importance of continuing these exercises at home.  She should return to clinic as needed for any new or worsening symptoms.  She understands and is in agreement.    After our discussion, the patient articulated understanding of the plan and felt that all questions had been answered satisfactorily. The patient was pleased with the visit and very appreciative for the care rendered.    **Please excuse any errors in grammar or translation related to this dictation. Voice recognition software was utilized to prepare this document. **                  Tanya Dinh MD    Department of Orthopaedic Surgery  [facility]  [email or web address]

## 2025-06-02 NOTE — LETTER
Deanna 3, 2025     Ashley Delarosa MD  [address] [facility] [address]  [facility] [address]    Patient: Caroline Bauer   YOB: 1990   Date of Visit: 6/2/2025       Dear Dr. Ashley Delarosa MD:    Thank you for referring Caroline Bauer to me for evaluation. Below are my notes for this consultation.  If you have questions, please do not hesitate to call me. I look forward to following your patient along with you.       Sincerely,     Tanya Dinh MD      CC: No Recipients  ______________________________________________________________________________________    S: Caroline Bauer is a 34 y.o. year old female patient who is here to follow up on low back pain.  She was last seen here in clinic on January 31, 2022 for right low back pain.  At that time she was diagnosed with right sacroiliitis she was given referral to pain management for a possible SI joint injection.  She did have that injection done and it was successful for a long period of time.  She returns today as she is began experiencing a return of her pain over the past few weeks.  She does report that she has some radiation down to her right glute.  She is currently in physical therapy both for her back as well as her pelvic floor, and she feels that physical therapy has been effective.  She does say she has been doing core and leg strengthening exercises.  She was recently prescribed extra strength ibuprofen by her PCP which is also offered some relief.      O:   General: Patient appears well-nourished and well-developed in no acute distress, Alert and Oriented x3  Psych: Pleasant mood and affect  HEENT: Extraocular muscles intact, pupils equal and round. Sclerae anicteric   Cardio: extremities warm and well perfused  Resp: unlabored symmetric breathing  Skin: no open wounds or rash  Musculoskeletal/Neuro Exam: Normal gait. She has tenderness to palpation over the right SI joint. Pain with Leonard exam on  the right negative on the left. Tight hamstrings bilaterally. Negative straight leg raise.  Lower extremity     Motor: Right leg with 5 out of 5 motor strength with hip flexion, knee extension, ankle dorsiflexion plantarflexion and EHL against resistance.  Left leg with 5 out of 5 motor strength with hip flexion, knee extension, ankle dorsiflexion plantarflexion EHL against resistance  Sensation to light touch intact along L2 to S1 distribution bilaterally  2+ patella Reflex bilaterally          Imaging:  I reviewed x-rays obtained in clinic today.  AP lateral flexion-extension views.  X-rays of the lumbar spine demonstrate normal coronal and sagittal alignment.  There is evidence of some disc space narrowing at L5-S1.  There is no spondylolisthesis evident with flexion-extension views.  Overall minimal degenerative changes.          A/P: Caroline Bauer is a 34 y.o. year old female patient with right sided back pain with tenderness over the right SI joint on exam.  She has positive Leonard on the right on exam I think this is a flareup of her sacroiliitis which she had responded well to injections previously.  She is neurologically intact with reassuring imaging in clinic today.  Discussed etiology of the patient's pain and that it is consistent with recurrent right sacroiliitis.  Given the previous injection was successful for her, I recommended that she receive another injection.  She was given a referral to pain management for this.  She can continue with anti-inflammatory therapy.  She wishes to continue with her current physical therapist for her strengthening and pelvic floor exercises.  Discussed the importance of continuing these exercises at home.  She should return to clinic as needed for any new or worsening symptoms.  She understands and is in agreement.    After our discussion, the patient articulated understanding of the plan and felt that all questions had been answered satisfactorily. The  patient was pleased with the visit and very appreciative for the care rendered.    **Please excuse any errors in grammar or translation related to this dictation. Voice recognition software was utilized to prepare this document. **                  Tanya Dinh MD    Department of Orthopaedic Surgery  [facility]  [email or web address]

## 2025-06-09 ENCOUNTER — TELEPHONE (OUTPATIENT)
Dept: PRIMARY CARE | Facility: CLINIC | Age: 35
End: 2025-06-09
Payer: COMMERCIAL

## 2025-06-23 ENCOUNTER — APPOINTMENT (OUTPATIENT)
Dept: RHEUMATOLOGY | Facility: CLINIC | Age: 35
End: 2025-06-23
Payer: COMMERCIAL

## 2025-06-24 ENCOUNTER — OFFICE VISIT (OUTPATIENT)
Dept: PRIMARY CARE | Facility: CLINIC | Age: 35
End: 2025-06-24
Payer: COMMERCIAL

## 2025-06-24 VITALS — SYSTOLIC BLOOD PRESSURE: 122 MMHG | DIASTOLIC BLOOD PRESSURE: 81 MMHG | HEART RATE: 84 BPM

## 2025-06-24 DIAGNOSIS — H92.01 ACUTE OTALGIA, RIGHT: Primary | ICD-10-CM

## 2025-06-24 DIAGNOSIS — J30.89 NON-SEASONAL ALLERGIC RHINITIS, UNSPECIFIED TRIGGER: ICD-10-CM

## 2025-06-24 DIAGNOSIS — N94.6 DYSMENORRHEA: ICD-10-CM

## 2025-06-24 PROCEDURE — 1036F TOBACCO NON-USER: CPT | Performed by: FAMILY MEDICINE

## 2025-06-24 PROCEDURE — 99214 OFFICE O/P EST MOD 30 MIN: CPT | Performed by: FAMILY MEDICINE

## 2025-06-24 RX ORDER — FLUTICASONE PROPIONATE 50 MCG
2 SPRAY, SUSPENSION (ML) NASAL DAILY
Qty: 16 G | Refills: 3 | Status: SHIPPED | OUTPATIENT
Start: 2025-06-24 | End: 2025-09-22

## 2025-06-24 RX ORDER — AMOXICILLIN 875 MG/1
875 TABLET, COATED ORAL 2 TIMES DAILY
Qty: 14 TABLET | Refills: 0 | Status: SHIPPED | OUTPATIENT
Start: 2025-06-24 | End: 2025-07-01

## 2025-06-24 ASSESSMENT — ENCOUNTER SYMPTOMS
ADENOPATHY: 0
TROUBLE SWALLOWING: 0
COUGH: 0
STRIDOR: 0
UNEXPECTED WEIGHT CHANGE: 0
VOICE CHANGE: 0
FACIAL SWELLING: 0
SHORTNESS OF BREATH: 0
NAUSEA: 0
SORE THROAT: 0
CHILLS: 0
NECK PAIN: 0
FEVER: 0
VOMITING: 0
FATIGUE: 0
APPETITE CHANGE: 0

## 2025-06-24 NOTE — ASSESSMENT & PLAN NOTE
-Possibly since she started Zepbound in October 2024.  Side effects suspected.  -Blood test ordered.

## 2025-06-24 NOTE — PROGRESS NOTES
Subjective   Patient ID: Caroline Bauer is a 34 y.o. female who presents for ear issues. Last seen 3/2025. 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 TSH 1.   10/24: TG <0.1, TSH 0.5    HPI    Review of Systems   Constitutional:  Negative for appetite change, chills, fatigue, fever and unexpected weight change.   HENT:  Negative for dental problem, drooling, ear pain, facial swelling, hearing loss, mouth sores, sore throat, tinnitus, trouble swallowing and voice change.    Respiratory:  Negative for cough, shortness of breath and stridor.    Gastrointestinal:  Negative for nausea and vomiting.   Musculoskeletal:  Negative for neck pain.   Hematological:  Negative for adenopathy.   All other systems reviewed and are negative.    Objective   Physical Exam  HENT:      Head: Normocephalic and atraumatic.      Right Ear: Tympanic membrane and ear canal normal.      Left Ear: Tympanic membrane and ear canal normal.      Nose: Nose normal.      Mouth/Throat:      Mouth: Mucous membranes are moist.      Pharynx: Oropharynx is clear.      Comments: No mouth sores  Eyes:      Conjunctiva/sclera: Conjunctivae normal.   Neck:      Comments: S/p thyroidectomy scar well healed.  No recurrent tissue.  She has tenderness along her SCM.  No pathologic LAD  Neurological:      Mental Status: She is alert and oriented to person, place, and time.   Psychiatric:         Attention and Perception: Attention normal.         Mood and Affect: Mood normal.         Speech: Speech normal.         Behavior: Behavior normal. Behavior is cooperative.       Procedure:  PROCEDURE NOTE:  Recommended flexible nasopharyngoscopy & laryngoscopy.  Risks, benefits, personnel and alternatives were explained.   The patient wished to proceed.  S/he was re-identified.  PROCEDURE:  Flexible nasopharyngoscopy and laryngoscopy  PREOPERATIVE DIAGNOSIS: Throat discomfort/odynophagia  POSTOPERATIVE DIAGNOSIS: Same as above, normal oropharynx, hypopharynx and larynx  INDICATIONS: Inability to tolerate mirror exam  ANESTHESIA: 4% lidocaine and 0.5% phenylephrine  PROCEDURE:  With the patient sitting upright topical anesthesia and vasoconstriction was applied with spray to the right side(s) of the nose.  After waiting an appropriate period of time for anesthesia/vasoconstriction to become effective, a flexible laryngoscope was passed through the right side(s) of the nose.  Nasopharynx, oropharynx, hypopharynx and larynx were examined.  FINDINGS:  The nasopharynx and oropharynx were normal without any lesions or masses visualized.  No masses or lesions were visualized at the base of tongue, vallecula, epiglottis, aryepiglottic folds, pyriform sinuses, and lateral pharyngeal walls.  True vocal fold movement was normal.  The patient's airway was widely patent with no evidence of obstruction.  Patient tolerated the procedure well, and there were no complications.     Assessment/Plan   Problem List Items Addressed This Visit    None       Slime Paul MD    Head & Neck Surgical Oncology & Reconstruction  Department of Otolaryngology - Head and Neck Surgery

## 2025-06-24 NOTE — PROGRESS NOTES
"Subjective   Patient ID: Caroline Bauer is a 34 y.o. female who presents for Earache.  Earache     The patient is a 35 yo A female with a history of anxiety, right minimally invasive follicular papillary thyroid carcinoma s/p right thyroidectomy \"20, 6.4 cm right hepatic focal nodular hyperplasia, pancreatic benign cyst- resolved, lung nodule, LSIL 9/17/2012, here for:    1- right otalgia for the past 3 days.  2- dysmenorrhea since she started Zepbound in October 2024.     A review of system was completed.  All systems were reviewed and were normal, except for the ones that are listed in the HPI.    Objective   Physical Exam  Constitutional:       Appearance: Normal appearance.   HENT:      Head: Normocephalic and atraumatic.      Right Ear: Tympanic membrane, ear canal and external ear normal.      Left Ear: Tympanic membrane, ear canal and external ear normal.      Nose: Nose normal.      Mouth/Throat:      Mouth: Mucous membranes are moist.      Pharynx: Oropharynx is clear.   Eyes:      Extraocular Movements: Extraocular movements intact.      Conjunctiva/sclera: Conjunctivae normal.      Pupils: Pupils are equal, round, and reactive to light.   Cardiovascular:      Rate and Rhythm: Normal rate and regular rhythm.      Pulses: Normal pulses.   Pulmonary:      Effort: Pulmonary effort is normal.      Breath sounds: Normal breath sounds.   Abdominal:      General: Abdomen is flat. Bowel sounds are normal.      Palpations: Abdomen is soft.   Musculoskeletal:         General: Normal range of motion.      Cervical back: Normal range of motion and neck supple.   Skin:     General: Skin is warm.   Neurological:      General: No focal deficit present.      Mental Status: She is alert and oriented to person, place, and time. Mental status is at baseline.   Psychiatric:         Mood and Affect: Mood normal.         Behavior: Behavior normal.         Thought Content: Thought content normal.         Judgment: " Judgment normal.   Assessment/Plan   Problem List Items Addressed This Visit       Dysmenorrhea    -Possibly since she started Zepbound in October 2024.  Side effects suspected.  -Blood test ordered.          Relevant Orders    FSH & LH    TSH with reflex to Free T4 if abnormal    [facility] HCG, TOTAL, QN    Allergic rhinitis    Relevant Medications    fluticasone (Flonase) 50 mcg/actuation nasal spray    Acute otalgia, right - Primary    -Amoxicillin 875 mg BID for 7 days started.  -Flonase refilled.          Relevant Medications    amoxicillin (Amoxil) 875 mg tablet    Patient to return to office if not better within 1-2 weeks.

## 2025-06-26 ENCOUNTER — OFFICE VISIT (OUTPATIENT)
Dept: PAIN MEDICINE | Facility: HOSPITAL | Age: 35
End: 2025-06-26
Payer: COMMERCIAL

## 2025-06-26 DIAGNOSIS — M46.1 SACROILIITIS: ICD-10-CM

## 2025-06-26 DIAGNOSIS — M54.16 LUMBAR RADICULOPATHY: ICD-10-CM

## 2025-06-26 PROCEDURE — 99214 OFFICE O/P EST MOD 30 MIN: CPT | Performed by: ANESTHESIOLOGY

## 2025-06-26 PROCEDURE — 99204 OFFICE O/P NEW MOD 45 MIN: CPT | Performed by: ANESTHESIOLOGY

## 2025-06-26 ASSESSMENT — PAIN SCALES - GENERAL: PAINLEVEL_OUTOF10: 7

## 2025-06-26 NOTE — PROGRESS NOTES
Chief complaint back pain     HPI   Caroline Bauer is a 34-year-old female with a history of back pain.  The patient was referred by Dr. Tanya Dinh with orthopedic spine surgery.  The patient says that this type of pain began 2 years ago.  The patient says that she jumped out of bed and she felt a pop in her low back.  The patient says that from 1 month she had back pain and radicular pain, sciatica into the leg.  The patient says that she had a flareup about 2 weeks ago.  She says that the flare was so terrible that she had to be off of work for 5 days straight.  She says that the pain is worse with coughing, sneezing, worse with sitting.  She says that she uses a sit to stand desk at work but even that was not giving her relief.  She says when she is in a flare the best position is being on her stomach.  The patient says that she does always have some sort of low-grade pain in this area but it can wax and wane in severity and flareup significantly at times.  The patient has tried gabapentin, ibuprofen, cyclobenzaprine, acupuncture, and physical therapy directed at her back and pelvic floor.  The patient continues to say that her pain is a 7 out of 10 at this time.  She did have an MRI which showed some disc bulging and was done at the [facility], degenerative changes at L4-5.    Previously she underwent a right sided SI joint injection which was done in January 2023.  Patient had this procedure performed at [facility] by Dr. Zamora.    Patient medication list shows cyclobenzaprine, ibuprofen 800 mg.    ROS: 13 point review of systems is complete and is negative listed above in HPI    Medical History[1]    Surgical History[2]    Family History[3]    Social History[4]    Medications Ordered Prior to Encounter[5]     RX Allergies[6]       Imaging:    DATE OF EXAM: Jun 13 2025  9:28AM      [facility]   0303  -  MRI LUMBAR SPINE WO IVCON  / ACCESSION #  [id number]     PROCEDURE REASON: multiple diagnoses          *  * * * Physician Interpretation * * * *      EXAMINATION:  MRI LUMBAR SPINE WO IVCON     CLINICAL HISTORY:  Chronic bilateral low back pain, unspecified whether   sciatica present Chronic bilateral low back pain, unspecified whether   sciatica present Sacroiliitis       TECHNIQUE: Routine lumbosacral spine MR protocol without gadolinium.     MQ:  [id number]     COMPARISON: None.       RESULT:       Counting reference:  Lumbosacral junction.  For the purposes of this   report,  L4-5 is considered the level of the iliac crest and assume there   are 5 lumbar-type vertebrae.  Anatomic variant:  None.     Localizer images:  No additional findings.     Alignment:    Alignment is anatomic.     Bone marrow signal/fracture:  No evidence of pathologic marrow   infiltration.  No evidence of prior fracture.     Conus:  The conus is within normal limits of signal intensity and   morphology.     Paraspinal soft tissues:   Paraspinal soft tissues are within normal   limits.     Lower thoracic spine:  Visualized lower thoracic canal and foramina are   patent.     L1-L2:   Canal and foramina are patent.     L2-L3:    Canal and foramina are patent     L3-L4:    Canal and foramina are patent     L4-L5:    Shallow disc bulging.  Minimal subarticular zones.  Mild   degenerative facet arthrosis.  No significant canal or foraminal stenosis.     L5-S1:    Canal and foramina are patent     Sacrum and iliac wings:   The visualized sacrum and iliac wings are   within normal limits.     Physical Exam:  Gen.: No acute distress  Eyes: Pupils symmetric  ENT: Hearing intact  Neck: No JVD noted  Respiratory: No SOB  Cardiovascular: Extremity show no edema   Skin: No rashes  Musculoskeletal: Gait is grossly normal, positive Leonard on right, tenderness to palpation over the right SI joint, positive compression test  Neurologic: Cranial nerves II through XII are grossly intact  Psychiatric:  Patient is alert and oriented  x3    Impression/Plan:  34-year-old female with a history of back pain which is most consistent with discogenic type pain.  Patient previously had the right SI joint injected a year and a half ago that gave total relief of the pain and lasted 6 months or more.    -Will obtain images from [facility], the MRI images to review in PACS.    -Consider possible right SI joint injection versus epidural at L4 bilaterally pending imaging.  Procedures, risk, benefits, and alternatives reviewed.    -Discussed that the mainstay of treatment is keeping up with physical therapy and core strengthening exercises long-term as a treatment and preventative measure.         [1]   Past Medical History:  Diagnosis Date    Abdominal distension (gaseous) 09/30/2019    Abdominal bloating    Acute swimmer's ear of both sides 09/24/2023    Acute upper respiratory infection, unspecified 03/18/2020    Acute URI    Acute upper respiratory infection, unspecified 01/22/2018    Acute URI    Anxiety     Asthma     Cancer (Multi)     Depression     Disease of thyroid gland     Exposure to COVID-19 virus 09/24/2023    GERD (gastroesophageal reflux disease)     Hepatomegaly, not elsewhere classified 06/15/2022    Liver mass    Low back pain     Nausea 01/13/2020    Nausea in adult    Neck pain     Noninfective gastroenteritis and colitis, unspecified 09/25/2019    Acute gastroenteritis    Otalgia, left ear 12/26/2018    Otalgia of left ear    Other specified diseases of anus and rectum 06/16/2021    Rectalgia    Personal history of other diseases of the female genital tract 05/28/2020    History of vaginal discharge    Personal history of other diseases of the respiratory system     History of acute pharyngitis    Personal history of other diseases of the respiratory system 09/19/2019    History of acute pharyngitis    Personal history of other diseases of the respiratory system 07/13/2018    History of acute pharyngitis    Personal history of other diseases  of the respiratory system 02/16/2018    History of acute sinusitis    Personal history of other diseases of the respiratory system 12/26/2018    History of allergic rhinitis    Personal history of other specified conditions 09/30/2019    History of abdominal pain    Personal history of other specified conditions 05/11/2017    History of dry mouth    Personal history of other specified conditions 07/18/2017    History of nausea    Psychophysiologic insomnia 03/31/2017    Chronic insomnia    Sprain of unspecified ligament of right ankle, initial encounter 07/16/2019    Right ankle sprain    TMJ dysfunction     Trauma of chest 09/24/2023    Unspecified asthma with (acute) exacerbation ([facility]) 04/21/2017    Acute asthma exacerbation   [2]   Past Surgical History:  Procedure Laterality Date    OTHER SURGICAL HISTORY  01/28/2020    Hemithyroidectomy    THYROIDECTOMY     [3]   Family History  Problem Relation Name Age of Onset    Anxiety disorder Mother      Bipolar disorder Mother          depression    Diabetes Mother      Hypertension Mother      Cervical cancer Mother      Drug abuse Father      Hypertension Father      Bipolar disorder Brother          depression    Rheum arthritis Mother Family     Rashes / Skin problems Mother Family     Cancer Father Family     Thyroid disease Brother Family     Seizures Brother Family     Asthma Brother Family    [4]   Social History  Tobacco Use    Smoking status: Never     Passive exposure: Never    Smokeless tobacco: Never   Vaping Use    Vaping status: Never Used   Substance Use Topics    Alcohol use: Not Currently     Comment: Holidays    Drug use: Never   [5]   Current Outpatient Medications on File Prior to Visit   Medication Sig Dispense Refill    amoxicillin (Amoxil) 875 mg tablet Take 1 tablet (875 mg) by mouth 2 times a day for 7 days. 14 tablet 0    budesonide-formoteroL (Symbicort) 160-4.5 mcg/actuation inhaler Inhale 2 puffs 2 times a day. Rinse mouth with water  after use to reduce aftertaste and incidence of candidiasis. Do not swallow. 10.2 g 5    cyclobenzaprine (Flexeril) 5 mg tablet PLEASE SEE ATTACHED FOR DETAILED DIRECTIONS      Cytomel 5 mcg tablet Take 1 tablet (5 mcg) by mouth once daily. 90 tablet 1    ergocalciferol (Vitamin D-2) 1.25 MG (80332 UT) capsule Take 1 capsule (1,250 mcg) by mouth 1 (one) time per week.      fluticasone (Flonase) 50 mcg/actuation nasal spray Administer 2 sprays into each nostril once daily. 16 g 3    gentamicin (Garamycin) 0.1 % ointment Apply to the affected area of lip three to four times per day for 7 days 15 g 0    ibuprofen 800 mg tablet Take 1 tablet (800 mg) by mouth 3 times a day as needed for mild pain (1 - 3) (pain). 60 tablet 5    liothyronine (Cytomel) 5 mcg tablet TAKE 1/2 TABLETS (2.5 MCG) BY MOUTH ONCE DAILY 45 tablet 1    metFORMIN  mg 24 hr tablet Take 2 tablets (1,000 mg) by mouth once daily.      Synthroid 137 mcg tablet Take 1 tablet (137 mcg) by mouth once daily in the morning. Take before meals. 90 tablet 1    triamcinolone (Kenalog) 0.1 % ointment Apply to affected areas twice daily when active as needed. Use less than 14 days per month. 30 g 0    [DISCONTINUED] fluticasone (Flonase) 50 mcg/actuation nasal spray Administer 2 sprays into each nostril once daily. 16 g 3     No current facility-administered medications on file prior to visit.   [6]   Allergies  Allergen Reactions    Escitalopram Anxiety, Hallucinations, Hives, Other and Palpitations     depression

## 2025-06-27 LAB
B-HCG SERPL-ACNC: <5 MIU/ML
FSH SERPL-ACNC: 8.1 MIU/ML
LH SERPL-ACNC: 5.7 MIU/ML
TSH SERPL-ACNC: 1.2 MIU/L

## 2025-06-30 ENCOUNTER — APPOINTMENT (OUTPATIENT)
Dept: OTOLARYNGOLOGY | Facility: CLINIC | Age: 35
End: 2025-06-30
Payer: COMMERCIAL

## 2025-07-01 ASSESSMENT — ENCOUNTER SYMPTOMS
NAUSEA: 0
STRIDOR: 0
SORE THROAT: 0
COUGH: 0
CHILLS: 0
FATIGUE: 0
VOICE CHANGE: 0
UNEXPECTED WEIGHT CHANGE: 0
VOMITING: 0
FACIAL SWELLING: 0
APPETITE CHANGE: 0
TROUBLE SWALLOWING: 0
FEVER: 0
ADENOPATHY: 0
NECK PAIN: 0
SHORTNESS OF BREATH: 0

## 2025-07-01 NOTE — PROGRESS NOTES
Subjective   Patient ID: Caroline Bauer is a 34 y.o. female who presents for ear issues. Last seen 3/2025. 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  She is here today because she has been having chronic right otalgia off and on since she was alley diving ~2019.  The otalgia is only present on the right side.  Recently she had drainage from the right side.  She was seen by her PCP who prescribed her amoxicillin.  She is flying tomorrow and wanted her ears checked before her flight.  Left side is not as bad.  No hearing change.  No dizziness/vertigo.  Eating/drinking well.      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 TSH 1.   10/24: TG <0.1, TSH 0.5    HPI    Review of Systems   Constitutional:  Negative for appetite change, chills, fatigue, fever and unexpected weight change.   HENT:  Negative for dental problem, drooling, ear pain, facial swelling, hearing loss, mouth sores, sore throat, tinnitus, trouble swallowing and voice change.    Respiratory:  Negative for cough, shortness of breath and stridor.    Gastrointestinal:  Negative for nausea and vomiting.   Musculoskeletal:  Negative for neck pain.   Hematological:  Negative for adenopathy.   All other systems reviewed and are negative.    Objective   Physical Exam  HENT:      Head: Normocephalic and atraumatic.      Right Ear: Tympanic membrane, ear canal and external ear normal.      Left Ear: Tympanic membrane, ear canal and external ear normal.      Ears:      Comments: With particular attention to the right there is no evidence of EAC edema or erythema.  No fungal spores.  TM normal, no SHAZIA. Culture obtained on the right.     Nose: Nose normal.      Mouth/Throat:      Mouth: Mucous membranes are moist.      Pharynx: Oropharynx is clear.      Comments:  No mouth sores  Eyes:      Conjunctiva/sclera: Conjunctivae normal.   Neck:      Comments: S/p thyroidectomy scar well healed.   Neurological:      Mental Status: She is alert and oriented to person, place, and time.   Psychiatric:         Attention and Perception: Attention normal.         Mood and Affect: Mood normal.         Speech: Speech normal.         Behavior: Behavior normal. Behavior is cooperative.         Assessment/Plan   Problem List Items Addressed This Visit           ICD-10-CM    Acute otalgia, right - Primary H92.01    Right otalgia - she reports this has been chronic since 2019 when she was alley diving although somewhat intermittent and episodic  Today there is no evidence of AOM/AOE  Although no fungal/bacterial infection is seen I obtained EAC culture to ensure no colonization  Do not recommend further abx at this time - she was tx with amox 6/24/25 which she completed  I also provided her with my ETD flying protocol - sudafed and phenylephrine on the day of the flight to help prevent ETD and further otalgia  Follow up as needed, discussed that I am leaving  but she can follow up with any ENT as needed          Other Visit Diagnoses         Codes      Drainage from right ear     H92.11    Relevant Orders    Tissue/Wound Culture/Smear             Slime Paul MD    Head & Neck Surgical Oncology & Reconstruction  Department of Otolaryngology - Head and Neck Surgery

## 2025-07-07 ENCOUNTER — APPOINTMENT (OUTPATIENT)
Dept: OTOLARYNGOLOGY | Facility: CLINIC | Age: 35
End: 2025-07-07
Payer: COMMERCIAL

## 2025-07-07 VITALS — TEMPERATURE: 98.6 F | WEIGHT: 176 LBS | BODY MASS INDEX: 30.05 KG/M2 | HEIGHT: 64 IN

## 2025-07-07 DIAGNOSIS — H92.01 ACUTE OTALGIA, RIGHT: Primary | ICD-10-CM

## 2025-07-07 DIAGNOSIS — H92.11 DRAINAGE FROM RIGHT EAR: ICD-10-CM

## 2025-07-07 PROCEDURE — 1036F TOBACCO NON-USER: CPT | Performed by: OTOLARYNGOLOGY

## 2025-07-07 PROCEDURE — 3008F BODY MASS INDEX DOCD: CPT | Performed by: OTOLARYNGOLOGY

## 2025-07-07 PROCEDURE — 99213 OFFICE O/P EST LOW 20 MIN: CPT | Performed by: OTOLARYNGOLOGY

## 2025-07-07 ASSESSMENT — PATIENT HEALTH QUESTIONNAIRE - PHQ9
2. FEELING DOWN, DEPRESSED OR HOPELESS: NOT AT ALL
1. LITTLE INTEREST OR PLEASURE IN DOING THINGS: NOT AT ALL
SUM OF ALL RESPONSES TO PHQ9 QUESTIONS 1 AND 2: 0

## 2025-07-07 NOTE — ASSESSMENT & PLAN NOTE
Right otalgia - she reports this has been chronic since 2019 when she was alley diving although somewhat intermittent and episodic  Today there is no evidence of AOM/AOE  Although no fungal/bacterial infection is seen I obtained EAC culture to ensure no colonization  Do not recommend further abx at this time - she was tx with amox 6/24/25 which she completed  I also provided her with my ETD flying protocol - sudafed and phenylephrine on the day of the flight to help prevent ETD and further otalgia  Follow up as needed, discussed that I am leaving  but she can follow up with any ENT as needed

## 2025-07-17 NOTE — H&P
Pain Management H&P    History Of Present Illness  Caroline Bauer is a 34 y.o. female presents for procedure stated below. Endorses no changes in past medical history or medical health since last seen in clinic.      Past Medical History  She has a past medical history of Abdominal distension (gaseous) (09/30/2019), Acute swimmer's ear of both sides (09/24/2023), Acute upper respiratory infection, unspecified (03/18/2020), Acute upper respiratory infection, unspecified (01/22/2018), Anxiety, Asthma, Cancer (Multi), Depression, Disease of thyroid gland, Exposure to COVID-19 virus (09/24/2023), GERD (gastroesophageal reflux disease), Hepatomegaly, not elsewhere classified (06/15/2022), Low back pain, Nausea (01/13/2020), Neck pain, Noninfective gastroenteritis and colitis, unspecified (09/25/2019), Otalgia, left ear (12/26/2018), Other specified diseases of anus and rectum (06/16/2021), Personal history of other diseases of the female genital tract (05/28/2020), Personal history of other diseases of the respiratory system, Personal history of other diseases of the respiratory system (09/19/2019), Personal history of other diseases of the respiratory system (07/13/2018), Personal history of other diseases of the respiratory system (02/16/2018), Personal history of other diseases of the respiratory system (12/26/2018), Personal history of other specified conditions (09/30/2019), Personal history of other specified conditions (05/11/2017), Personal history of other specified conditions (07/18/2017), Psychophysiologic insomnia (03/31/2017), Sprain of unspecified ligament of right ankle, initial encounter (07/16/2019), TMJ dysfunction, Trauma of chest (09/24/2023), and Unspecified asthma with (acute) exacerbation ([facility]) (04/21/2017).    Surgical History  She has a past surgical history that includes Other surgical history (01/28/2020) and Thyroidectomy.     Social History  She reports that she has never smoked.  She has never been exposed to tobacco smoke. She has never used smokeless tobacco. She reports that she does not currently use alcohol. She reports that she does not use drugs.    Family History  Family History[1]     Allergies  Escitalopram    Review of Symptoms:   Constitutional: Negative for chills, diaphoresis or fever  HENT: Negative for neck swelling  Eyes:.  Negative for eye pain  Respiratory:.  Negative for cough, shortness of breath or wheezing    Cardiovascular:.  Negative for chest pain or palpitations  Gastrointestinal:.  Negative for abdominal pain, nausea and vomiting  Genitourinary:.  Negative for urgency  Musculoskeletal:  Positive for back pain. Positive for joint pain. Denies falls within the past 3 months.  Skin: Negative for wounds or itching   Neurological: Negative for dizziness, seizures, loss of consciousness and weakness  Endo/Heme/Allergies: Does not bruise/bleed easily  Psychiatric/Behavioral: Negative for depression. The patient does not appear anxious.      Pre-sedation Evaluation  ASA class 2  Mallampati score 2     PHYSICAL EXAM  Vitals signs reviewed  Constitutional:       General: Not in acute distress     Appearance: Normal appearance. Not ill-appearing.  HENT:     Head: Normocephalic and atraumatic  Eyes:     Conjunctiva/sclera: Conjunctivae normal  Cardiovascular:     Rate and Rhythm: Normal rate and regular rhythm  Pulmonary:     Effort: No respiratory distress  Abdominal:     Palpations: Abdomen is soft  Musculoskeletal: RUIZ  Skin:     General: Skin is warm and dry  Neurological:     General: No focal deficit present  Psychiatric:         Mood and Affect: Mood normal         Behavior: Behavior normal     Last Recorded Vitals  There were no vitals taken for this visit.    Relevant Results  Current Outpatient Medications   Medication Instructions    budesonide-formoteroL (Symbicort) 160-4.5 mcg/actuation inhaler 2 puffs, inhalation, 2 times daily RT, Rinse mouth with water after  use to reduce aftertaste and incidence of candidiasis. Do not swallow.    cyclobenzaprine (Flexeril) 5 mg tablet PLEASE SEE ATTACHED FOR DETAILED DIRECTIONS    Cytomel 5 mcg, oral, Daily    ergocalciferol (Vitamin D-2) 1.25 MG (86904 UT) capsule 1 capsule, Once Weekly    fluticasone (Flonase) 50 mcg/actuation nasal spray 2 sprays, Each Nostril, Daily    gentamicin (Garamycin) 0.1 % ointment Apply to the affected area of lip three to four times per day for 7 days    ibuprofen 800 mg, oral, 3 times daily PRN    liothyronine (Cytomel) 5 mcg tablet TAKE 1/2 TABLETS (2.5 MCG) BY MOUTH ONCE DAILY    metFORMIN XR (GLUCOPHAGE-XR) 1,000 mg, Daily RT    Synthroid 137 mcg, oral, Daily before breakfast    triamcinolone (Kenalog) 0.1 % ointment Apply to affected areas twice daily when active as needed. Use less than 14 days per month.         MR lumbar spine wo IV contrast 06/13/2025    Narrative  * * *Final Report* * *    DATE OF EXAM: Jun 13 2025  9:28AM    [facility]   0303  -  MRI LUMBAR SPINE WO IVCON  / ACCESSION #  [id number]    PROCEDURE REASON: multiple diagnoses    * * * * Physician Interpretation * * * *    EXAMINATION:  MRI LUMBAR SPINE WO IVCON    CLINICAL HISTORY:  Chronic bilateral low back pain, unspecified whether  sciatica present Chronic bilateral low back pain, unspecified whether  sciatica present Sacroiliitis      TECHNIQUE: Routine lumbosacral spine MR protocol without gadolinium.  MQ:  [id number]    COMPARISON: None.      RESULT:      Counting reference:  Lumbosacral junction.  For the purposes of this  report,  L4-5 is considered the level of the iliac crest and assume there  are 5 lumbar-type vertebrae.  Anatomic variant:  None.    Localizer images:  No additional findings.    Alignment:    Alignment is anatomic.    Bone marrow signal/fracture:  No evidence of pathologic marrow  infiltration.  No evidence of prior fracture.    Conus:  The conus is within normal limits of signal intensity  and  morphology.    Paraspinal soft tissues:   Paraspinal soft tissues are within normal  limits.    Lower thoracic spine:  Visualized lower thoracic canal and foramina are  patent.    L1-L2:    Canal and foramina are patent.    L2-L3:    Canal and foramina are patent    L3-L4:    Canal and foramina are patent    L4-L5:    Shallow disc bulging.  Minimal subarticular zones.  Mild  degenerative facet arthrosis.  No significant canal or foraminal stenosis.    L5-S1:    Canal and foramina are patent    Sacrum and iliac wings:   The visualized sacrum and iliac wings are  within normal limits.    Impression  IMPRESSION:    Mild spondylosis at L4-L5 as described.  No high-grade canal or foraminal  stenosis    Anatomic Lumbar Variant:                      : PSCB  Transcribe Date/Time: Jun 13 2025  9:50A    Dictated by : ZAY FERRARO DO  This examination was interpreted and the report reviewed and  electronically signed by:  ZAY FERRARO DO on Jun 13 2025  9:53AM  EST      MR LUMBAR SPINE WO IV CONTRAST 01/24/2023    Narrative  MRN: [id number]  Patient Name: QUENTIN MACE    STUDY:  MRI L-SPINE WO;  1/24/2023 3:06 pm    INDICATION:  lumbar radiculopathy  M54.50: Chronic low back pain M54.16: Lumbar  radiculopathy, chronic G89.29:.  32-year-old female with 1 year  history right sided radiculopathy and right groin pain.    COMPARISON:  Radiographs of the lumbar spine dated 01/10/2023    ACCESSION NUMBER(S):  [id number]    ORDERING CLINICIAN:  ORTEGA BROOKS    TECHNIQUE:  Multiplanar multisequence MR imaging of the lumbar spine performed  without intravenous contrast. Sagittal T1, T2, STIR, axial T1 and T2  weighted images of the lumbar spine were acquired.    FINDINGS:  Segmentation: Transitional anatomy noted on lumbar spine radiographs  from 01/10/2023. There are 6 non-rib-bearing lumbar-type vertebra.    Conus: The lower thoracic cord appears unremarkable. The conus  terminates at L1-2. Cauda  equina are unremarkable.    Epidural fluid: None.    Alignment: Within normal limits.    Vertebral bodies: The vertebral bodies demonstrate expected height.    Marrow signal: The marrow signal is grossly within normal limits.  There is no endplate edema.    Intervertebral discs: The intervertebral discs demonstrate expected  signal and height.    Degenerative change:    T12-L1: No significant spinal canal or neural foraminal stenosis on  provided sagittal images.  L1-2: Mild facet arthrosis without significant spinal canal or neural  foraminal stenosis on provided sagittal images.  L2-3: Mild facet arthrosis without significant spinal canal or neural  foraminal stenosis.  L3-4: Mild facet arthrosis without significant spinal canal or neural  foraminal stenosis.  L4-5: Minimal disc bulge and mild facet arthrosis without significant  spinal canal or neural foraminal stenosis.  L5-S1: Mild disc bulge and mild facet arthrosis without significant  spinal canal or neural foraminal stenosis.  S1-2: Minimal disc bulge without significant spinal canal or neural  foraminal stenosis.    Soft tissues: The prevertebral and posterior paraspinal soft tissues  are unremarkable.    Impression  Transitional anatomy noted on lumbar spine radiographs from  01/10/2023. There are 6 non-rib-bearing lumbar-type vertebra. The  last well-formed disc space will be considered S1-2, which  corresponds to axial series 9, image 18.    Minimal degenerative changes of the lumbar spine without high-grade  spinal canal or neural foraminal stenosis.    I personally reviewed the images/study and I agree with the findings  as stated. This study was interpreted at [facility], [place], [place].       ASSESSMENT/PLAN  Caroline Bauer is a 34 y.o. female here for right L4 TFESI with fluoroscopic guidance    Patient denies any recent antibiotic use or infections, denies any blood thinner use, and denies contrast or  local anesthetic allergies     Risks, benefits, alternatives discussed. All questions answered to the best of my ability. Patient agrees to proceed.      Our plan is as follows:  - Proceed with aforementioned procedure       Augusto Caceres, DO  Chronic Pain Management Fellow         [1]   Family History  Problem Relation Name Age of Onset    Anxiety disorder Mother      Bipolar disorder Mother          depression    Diabetes Mother      Hypertension Mother      Cervical cancer Mother      Drug abuse Father      Hypertension Father      Bipolar disorder Brother          depression    Rheum arthritis Mother Family     Rashes / Skin problems Mother Family     Cancer Father Family     Thyroid disease Brother Family     Seizures Brother Family     Asthma Brother Family

## 2025-07-18 ENCOUNTER — APPOINTMENT (OUTPATIENT)
Facility: HOSPITAL | Age: 35
End: 2025-07-18
Payer: COMMERCIAL

## 2025-08-26 ENCOUNTER — TELEMEDICINE (OUTPATIENT)
Dept: PRIMARY CARE | Facility: CLINIC | Age: 35
End: 2025-08-26
Payer: COMMERCIAL

## 2025-08-26 DIAGNOSIS — J45.20 MILD INTERMITTENT ASTHMA WITHOUT COMPLICATION (HHS-HCC): ICD-10-CM

## 2025-08-26 DIAGNOSIS — J06.9 ACUTE URI: Primary | ICD-10-CM

## 2025-08-26 DIAGNOSIS — E88.89 OTHER SPECIFIED METABOLIC DISORDERS: ICD-10-CM

## 2025-08-26 DIAGNOSIS — E85.81 LIGHT CHAIN (AL) AMYLOIDOSIS (MULTI): ICD-10-CM

## 2025-08-26 PROCEDURE — 1036F TOBACCO NON-USER: CPT | Performed by: FAMILY MEDICINE

## 2025-08-26 PROCEDURE — 99213 OFFICE O/P EST LOW 20 MIN: CPT | Performed by: FAMILY MEDICINE

## 2025-08-26 ASSESSMENT — ENCOUNTER SYMPTOMS
TROUBLE SWALLOWING: 1
STRIDOR: 1
COUGH: 1
SORE THROAT: 1
HEADACHES: 1
HOARSE VOICE: 1
NECK PAIN: 1
SWOLLEN GLANDS: 1

## 2025-09-10 ENCOUNTER — APPOINTMENT (OUTPATIENT)
Dept: RHEUMATOLOGY | Facility: CLINIC | Age: 35
End: 2025-09-10
Payer: COMMERCIAL

## 2026-01-13 ENCOUNTER — APPOINTMENT (OUTPATIENT)
Dept: PULMONOLOGY | Facility: HOSPITAL | Age: 36
End: 2026-01-13
Payer: COMMERCIAL

## 2026-02-03 ENCOUNTER — APPOINTMENT (OUTPATIENT)
Dept: DERMATOLOGY | Facility: CLINIC | Age: 36
End: 2026-02-03
Payer: COMMERCIAL